# Patient Record
Sex: FEMALE
[De-identification: names, ages, dates, MRNs, and addresses within clinical notes are randomized per-mention and may not be internally consistent; named-entity substitution may affect disease eponyms.]

---

## 2018-09-13 PROBLEM — Z00.00 ENCOUNTER FOR PREVENTIVE HEALTH EXAMINATION: Status: ACTIVE | Noted: 2018-09-13

## 2018-09-17 ENCOUNTER — APPOINTMENT (OUTPATIENT)
Dept: ULTRASOUND IMAGING | Facility: CLINIC | Age: 33
End: 2018-09-17
Payer: COMMERCIAL

## 2018-09-17 ENCOUNTER — OUTPATIENT (OUTPATIENT)
Dept: OUTPATIENT SERVICES | Facility: HOSPITAL | Age: 33
LOS: 1 days | End: 2018-09-17

## 2018-09-17 PROCEDURE — 76830 TRANSVAGINAL US NON-OB: CPT | Mod: 26

## 2018-09-17 PROCEDURE — 76856 US EXAM PELVIC COMPLETE: CPT | Mod: 26

## 2020-12-28 ENCOUNTER — OUTPATIENT (OUTPATIENT)
Dept: OUTPATIENT SERVICES | Facility: HOSPITAL | Age: 35
LOS: 1 days | End: 2020-12-28
Payer: COMMERCIAL

## 2020-12-28 DIAGNOSIS — O26.899 OTHER SPECIFIED PREGNANCY RELATED CONDITIONS, UNSPECIFIED TRIMESTER: ICD-10-CM

## 2020-12-28 DIAGNOSIS — Z3A.00 WEEKS OF GESTATION OF PREGNANCY NOT SPECIFIED: ICD-10-CM

## 2020-12-28 LAB — AMNISURE ROM (RUPTURE OF MEMBRANES): NEGATIVE — SIGNIFICANT CHANGE UP

## 2020-12-28 PROCEDURE — 99214 OFFICE O/P EST MOD 30 MIN: CPT

## 2020-12-28 PROCEDURE — 84112 EVAL AMNIOTIC FLUID PROTEIN: CPT

## 2020-12-28 PROCEDURE — 76815 OB US LIMITED FETUS(S): CPT | Mod: 26

## 2020-12-28 PROCEDURE — 99213 OFFICE O/P EST LOW 20 MIN: CPT

## 2020-12-29 ENCOUNTER — INPATIENT (INPATIENT)
Facility: HOSPITAL | Age: 35
LOS: 1 days | Discharge: HOME | End: 2020-12-31
Attending: OBSTETRICS & GYNECOLOGY | Admitting: OBSTETRICS & GYNECOLOGY
Payer: COMMERCIAL

## 2020-12-29 ENCOUNTER — RESULT REVIEW (OUTPATIENT)
Age: 35
End: 2020-12-29

## 2020-12-29 VITALS — WEIGHT: 171.08 LBS

## 2020-12-29 DIAGNOSIS — O26.899 OTHER SPECIFIED PREGNANCY RELATED CONDITIONS, UNSPECIFIED TRIMESTER: ICD-10-CM

## 2020-12-29 DIAGNOSIS — Z3A.00 WEEKS OF GESTATION OF PREGNANCY NOT SPECIFIED: ICD-10-CM

## 2020-12-29 LAB
BASOPHILS # BLD AUTO: 0.02 K/UL — SIGNIFICANT CHANGE UP (ref 0–0.2)
BASOPHILS NFR BLD AUTO: 0.2 % — SIGNIFICANT CHANGE UP (ref 0–2)
BLD GP AB SCN SERPL QL: NEGATIVE — SIGNIFICANT CHANGE UP
EOSINOPHIL # BLD AUTO: 0.09 K/UL — SIGNIFICANT CHANGE UP (ref 0–0.5)
EOSINOPHIL NFR BLD AUTO: 1.1 % — SIGNIFICANT CHANGE UP (ref 0–6)
HCT VFR BLD CALC: 39 % — SIGNIFICANT CHANGE UP (ref 34.5–45)
HGB BLD-MCNC: 13 G/DL — SIGNIFICANT CHANGE UP (ref 11.5–15.5)
IMM GRANULOCYTES NFR BLD AUTO: 0.7 % — SIGNIFICANT CHANGE UP (ref 0–1.5)
LYMPHOCYTES # BLD AUTO: 1.69 K/UL — SIGNIFICANT CHANGE UP (ref 1–3.3)
LYMPHOCYTES # BLD AUTO: 20.1 % — SIGNIFICANT CHANGE UP (ref 13–44)
MCHC RBC-ENTMCNC: 30.1 PG — SIGNIFICANT CHANGE UP (ref 27–34)
MCHC RBC-ENTMCNC: 33.3 GM/DL — SIGNIFICANT CHANGE UP (ref 32–36)
MCV RBC AUTO: 90.3 FL — SIGNIFICANT CHANGE UP (ref 80–100)
MONOCYTES # BLD AUTO: 0.5 K/UL — SIGNIFICANT CHANGE UP (ref 0–0.9)
MONOCYTES NFR BLD AUTO: 6 % — SIGNIFICANT CHANGE UP (ref 2–14)
NEUTROPHILS # BLD AUTO: 6.04 K/UL — SIGNIFICANT CHANGE UP (ref 1.8–7.4)
NEUTROPHILS NFR BLD AUTO: 71.9 % — SIGNIFICANT CHANGE UP (ref 43–77)
NRBC # BLD: 0 /100 WBCS — SIGNIFICANT CHANGE UP (ref 0–0)
PLATELET # BLD AUTO: 204 K/UL — SIGNIFICANT CHANGE UP (ref 150–400)
RBC # BLD: 4.32 M/UL — SIGNIFICANT CHANGE UP (ref 3.8–5.2)
RBC # FLD: 13.2 % — SIGNIFICANT CHANGE UP (ref 10.3–14.5)
RH IG SCN BLD-IMP: POSITIVE — SIGNIFICANT CHANGE UP
RH IG SCN BLD-IMP: POSITIVE — SIGNIFICANT CHANGE UP
SARS-COV-2 RNA SPEC QL NAA+PROBE: SIGNIFICANT CHANGE UP
T PALLIDUM AB TITR SER: NEGATIVE — SIGNIFICANT CHANGE UP
WBC # BLD: 8.4 K/UL — SIGNIFICANT CHANGE UP (ref 3.8–10.5)
WBC # FLD AUTO: 8.4 K/UL — SIGNIFICANT CHANGE UP (ref 3.8–10.5)

## 2020-12-29 PROCEDURE — 88307 TISSUE EXAM BY PATHOLOGIST: CPT | Mod: 26

## 2020-12-29 RX ORDER — HYDROCORTISONE 1 %
1 OINTMENT (GRAM) TOPICAL EVERY 6 HOURS
Refills: 0 | Status: DISCONTINUED | OUTPATIENT
Start: 2020-12-29 | End: 2020-12-31

## 2020-12-29 RX ORDER — KETOROLAC TROMETHAMINE 30 MG/ML
30 SYRINGE (ML) INJECTION ONCE
Refills: 0 | Status: DISCONTINUED | OUTPATIENT
Start: 2020-12-29 | End: 2020-12-29

## 2020-12-29 RX ORDER — FENTANYL/BUPIVACAINE/NS/PF 2MCG/ML-.1
250 PLASTIC BAG, INJECTION (ML) INJECTION
Refills: 0 | Status: DISCONTINUED | OUTPATIENT
Start: 2020-12-29 | End: 2020-12-29

## 2020-12-29 RX ORDER — ACETAMINOPHEN 500 MG
975 TABLET ORAL
Refills: 0 | Status: DISCONTINUED | OUTPATIENT
Start: 2020-12-29 | End: 2020-12-31

## 2020-12-29 RX ORDER — OXYCODONE HYDROCHLORIDE 5 MG/1
5 TABLET ORAL
Refills: 0 | Status: DISCONTINUED | OUTPATIENT
Start: 2020-12-29 | End: 2020-12-31

## 2020-12-29 RX ORDER — OXYCODONE HYDROCHLORIDE 5 MG/1
5 TABLET ORAL ONCE
Refills: 0 | Status: DISCONTINUED | OUTPATIENT
Start: 2020-12-29 | End: 2020-12-31

## 2020-12-29 RX ORDER — OXYTOCIN 10 UNIT/ML
333.33 VIAL (ML) INJECTION
Qty: 20 | Refills: 0 | Status: DISCONTINUED | OUTPATIENT
Start: 2020-12-29 | End: 2020-12-31

## 2020-12-29 RX ORDER — BENZOCAINE 10 %
1 GEL (GRAM) MUCOUS MEMBRANE EVERY 6 HOURS
Refills: 0 | Status: DISCONTINUED | OUTPATIENT
Start: 2020-12-29 | End: 2020-12-31

## 2020-12-29 RX ORDER — TETANUS TOXOID, REDUCED DIPHTHERIA TOXOID AND ACELLULAR PERTUSSIS VACCINE, ADSORBED 5; 2.5; 8; 8; 2.5 [IU]/.5ML; [IU]/.5ML; UG/.5ML; UG/.5ML; UG/.5ML
0.5 SUSPENSION INTRAMUSCULAR ONCE
Refills: 0 | Status: DISCONTINUED | OUTPATIENT
Start: 2020-12-29 | End: 2020-12-31

## 2020-12-29 RX ORDER — AER TRAVELER 0.5 G/1
1 SOLUTION RECTAL; TOPICAL EVERY 4 HOURS
Refills: 0 | Status: DISCONTINUED | OUTPATIENT
Start: 2020-12-29 | End: 2020-12-31

## 2020-12-29 RX ORDER — MAGNESIUM HYDROXIDE 400 MG/1
30 TABLET, CHEWABLE ORAL
Refills: 0 | Status: DISCONTINUED | OUTPATIENT
Start: 2020-12-29 | End: 2020-12-31

## 2020-12-29 RX ORDER — SODIUM CHLORIDE 9 MG/ML
1000 INJECTION, SOLUTION INTRAVENOUS
Refills: 0 | Status: DISCONTINUED | OUTPATIENT
Start: 2020-12-29 | End: 2020-12-29

## 2020-12-29 RX ORDER — DIPHENHYDRAMINE HCL 50 MG
25 CAPSULE ORAL EVERY 6 HOURS
Refills: 0 | Status: DISCONTINUED | OUTPATIENT
Start: 2020-12-29 | End: 2020-12-31

## 2020-12-29 RX ORDER — PRAMOXINE HYDROCHLORIDE 150 MG/15G
1 AEROSOL, FOAM RECTAL EVERY 4 HOURS
Refills: 0 | Status: DISCONTINUED | OUTPATIENT
Start: 2020-12-29 | End: 2020-12-31

## 2020-12-29 RX ORDER — LANOLIN
1 OINTMENT (GRAM) TOPICAL EVERY 6 HOURS
Refills: 0 | Status: DISCONTINUED | OUTPATIENT
Start: 2020-12-29 | End: 2020-12-31

## 2020-12-29 RX ORDER — SIMETHICONE 80 MG/1
80 TABLET, CHEWABLE ORAL EVERY 4 HOURS
Refills: 0 | Status: DISCONTINUED | OUTPATIENT
Start: 2020-12-29 | End: 2020-12-31

## 2020-12-29 RX ORDER — CITRIC ACID/SODIUM CITRATE 300-500 MG
15 SOLUTION, ORAL ORAL EVERY 6 HOURS
Refills: 0 | Status: DISCONTINUED | OUTPATIENT
Start: 2020-12-29 | End: 2020-12-29

## 2020-12-29 RX ORDER — IBUPROFEN 200 MG
600 TABLET ORAL EVERY 6 HOURS
Refills: 0 | Status: COMPLETED | OUTPATIENT
Start: 2020-12-29 | End: 2021-11-27

## 2020-12-29 RX ORDER — OXYTOCIN 10 UNIT/ML
2 VIAL (ML) INJECTION
Qty: 30 | Refills: 0 | Status: DISCONTINUED | OUTPATIENT
Start: 2020-12-29 | End: 2020-12-31

## 2020-12-29 RX ORDER — DIBUCAINE 1 %
1 OINTMENT (GRAM) RECTAL EVERY 6 HOURS
Refills: 0 | Status: DISCONTINUED | OUTPATIENT
Start: 2020-12-29 | End: 2020-12-31

## 2020-12-29 RX ORDER — OXYTOCIN 10 UNIT/ML
333.33 VIAL (ML) INJECTION
Qty: 20 | Refills: 0 | Status: DISCONTINUED | OUTPATIENT
Start: 2020-12-29 | End: 2020-12-29

## 2020-12-29 RX ORDER — SODIUM CHLORIDE 9 MG/ML
3 INJECTION INTRAMUSCULAR; INTRAVENOUS; SUBCUTANEOUS EVERY 8 HOURS
Refills: 0 | Status: DISCONTINUED | OUTPATIENT
Start: 2020-12-29 | End: 2020-12-31

## 2020-12-29 RX ADMIN — SODIUM CHLORIDE 125 MILLILITER(S): 9 INJECTION, SOLUTION INTRAVENOUS at 05:00

## 2020-12-29 RX ADMIN — SODIUM CHLORIDE 125 MILLILITER(S): 9 INJECTION, SOLUTION INTRAVENOUS at 05:32

## 2020-12-29 RX ADMIN — Medication 250 MILLILITER(S): at 06:47

## 2020-12-29 RX ADMIN — Medication 30 MILLIGRAM(S): at 19:07

## 2020-12-29 RX ADMIN — Medication 2 MILLIUNIT(S)/MIN: at 10:37

## 2020-12-29 RX ADMIN — Medication 1000 MILLIUNIT(S)/MIN: at 18:26

## 2020-12-29 RX ADMIN — SODIUM CHLORIDE 125 MILLILITER(S): 9 INJECTION, SOLUTION INTRAVENOUS at 12:11

## 2020-12-29 NOTE — LACTATION INITIAL EVALUATION - LACTATION INTERVENTIONS
initiate/review early breastfeeding management guidelines/initiate skin to skin/initiate hand expression routine/initiate/review techniques for position and latch

## 2020-12-29 NOTE — LACTATION INITIAL EVALUATION - NS LACT CON REASON FOR REQ
Met with dyad at about 5 hours of life, s/p  @ 40.6 wks. Mother now P1. Infant has stooled and and is due to void. Per mother infant had latched and fed well in L&D and she denied having any pain with the latch. Infant was sleepy at the time of this visit. He would latch briefly and suck a couple of times before falling asleep at the breast. Attempted via several positions with the same result. Enc mother to hand express and finger feed colostrum. She was observed to have easily expressible colostrum bilaterally. Reviewed breastfeeding education. Emphasized importance of responding to feeding cues, importance of skin to skin and the importance of a proper latch. Demonstrated breast massage and hand expression and mother was able to return demo with colostrum noted bilaterally. Reviewed pertinent information in the postpartum/ guide booklet. Mother has a breast pump for home use. Mother and father verbalized understanding of all information and denied having questions/concerns at this time. Informed about lactation availability./primaparous mom

## 2020-12-30 ENCOUNTER — TRANSCRIPTION ENCOUNTER (OUTPATIENT)
Age: 35
End: 2020-12-30

## 2020-12-30 LAB
SARS-COV-2 IGG SERPL QL IA: NEGATIVE — SIGNIFICANT CHANGE UP
SARS-COV-2 IGM SERPL IA-ACNC: <0.1 INDEX — SIGNIFICANT CHANGE UP

## 2020-12-30 RX ORDER — ACETAMINOPHEN 500 MG
3 TABLET ORAL
Qty: 0 | Refills: 0 | DISCHARGE
Start: 2020-12-30

## 2020-12-30 RX ORDER — BENZOCAINE 10 %
1 GEL (GRAM) MUCOUS MEMBRANE
Qty: 0 | Refills: 0 | DISCHARGE
Start: 2020-12-30

## 2020-12-30 RX ORDER — IBUPROFEN 200 MG
600 TABLET ORAL EVERY 6 HOURS
Refills: 0 | Status: DISCONTINUED | OUTPATIENT
Start: 2020-12-30 | End: 2020-12-31

## 2020-12-30 RX ORDER — IBUPROFEN 200 MG
1 TABLET ORAL
Qty: 0 | Refills: 0 | DISCHARGE
Start: 2020-12-30

## 2020-12-30 RX ADMIN — Medication 975 MILLIGRAM(S): at 10:00

## 2020-12-30 RX ADMIN — Medication 975 MILLIGRAM(S): at 21:23

## 2020-12-30 RX ADMIN — Medication 975 MILLIGRAM(S): at 09:37

## 2020-12-30 RX ADMIN — Medication 600 MILLIGRAM(S): at 13:00

## 2020-12-30 RX ADMIN — Medication 600 MILLIGRAM(S): at 07:14

## 2020-12-30 RX ADMIN — Medication 975 MILLIGRAM(S): at 16:00

## 2020-12-30 RX ADMIN — Medication 1 APPLICATION(S): at 09:36

## 2020-12-30 RX ADMIN — Medication 600 MILLIGRAM(S): at 12:48

## 2020-12-30 RX ADMIN — Medication 600 MILLIGRAM(S): at 06:30

## 2020-12-30 RX ADMIN — Medication 1 TABLET(S): at 12:47

## 2020-12-30 RX ADMIN — Medication 1 APPLICATION(S): at 09:35

## 2020-12-30 RX ADMIN — Medication 600 MILLIGRAM(S): at 18:16

## 2020-12-30 RX ADMIN — Medication 975 MILLIGRAM(S): at 02:25

## 2020-12-30 RX ADMIN — Medication 1 SPRAY(S): at 09:36

## 2020-12-30 RX ADMIN — SODIUM CHLORIDE 3 MILLILITER(S): 9 INJECTION INTRAMUSCULAR; INTRAVENOUS; SUBCUTANEOUS at 14:00

## 2020-12-30 RX ADMIN — Medication 975 MILLIGRAM(S): at 03:00

## 2020-12-30 RX ADMIN — Medication 975 MILLIGRAM(S): at 22:00

## 2020-12-30 RX ADMIN — Medication 975 MILLIGRAM(S): at 15:44

## 2020-12-30 NOTE — DISCHARGE NOTE OB - CARE PROVIDER_API CALL
Sharlene Quinn)  Obstetrics and Gynecology  [facility], [address]  [place] [address]  Phone: [phone]  Fax: [phone]  Follow Up Time:

## 2020-12-30 NOTE — DISCHARGE NOTE OB - PATIENT PORTAL LINK FT
You can access the FollowMyHealth Patient Portal offered by [facility] by registering at the following website: [email or web address]. By joining [employer]’s FollowMyHealth portal, you will also be able to view your health information using other applications (apps) compatible with our system.

## 2020-12-30 NOTE — PROGRESS NOTE ADULT - ASSESSMENT
A/P 35y PPD#1 s/p , stable  1. Pain: well controlled on OPM  2. GI: Regular diet  3. : voiding  4. DVT prophylaxis: ambulate  5. Dispo: PPD 1 or 2, unless otherwise specified No

## 2020-12-30 NOTE — DISCHARGE NOTE OB - MATERIALS PROVIDED
Vaccinations/[facility]  Screening Program/  Immunization Record/Breastfeeding Log/Guide to Postpartum Care/[facility] Hearing Screen Program/Back To Sleep Handout/Shaken Baby Prevention Handout/Breastfeeding Guide and Packet/Birth Certificate Instructions/Tdap Vaccination (VIS Pub Date: 2012)

## 2020-12-31 VITALS
OXYGEN SATURATION: 98 % | TEMPERATURE: 98 F | HEART RATE: 61 BPM | SYSTOLIC BLOOD PRESSURE: 103 MMHG | DIASTOLIC BLOOD PRESSURE: 66 MMHG | RESPIRATION RATE: 18 BRPM

## 2020-12-31 LAB — SURGICAL PATHOLOGY STUDY: SIGNIFICANT CHANGE UP

## 2020-12-31 PROCEDURE — 86850 RBC ANTIBODY SCREEN: CPT

## 2020-12-31 PROCEDURE — 86901 BLOOD TYPING SEROLOGIC RH(D): CPT

## 2020-12-31 PROCEDURE — 88307 TISSUE EXAM BY PATHOLOGIST: CPT

## 2020-12-31 PROCEDURE — 59050 FETAL MONITOR W/REPORT: CPT

## 2020-12-31 PROCEDURE — 99214 OFFICE O/P EST MOD 30 MIN: CPT

## 2020-12-31 PROCEDURE — 86900 BLOOD TYPING SEROLOGIC ABO: CPT

## 2020-12-31 PROCEDURE — 85025 COMPLETE CBC W/AUTO DIFF WBC: CPT

## 2020-12-31 PROCEDURE — 86780 TREPONEMA PALLIDUM: CPT

## 2020-12-31 PROCEDURE — U0003: CPT

## 2020-12-31 PROCEDURE — 36415 COLL VENOUS BLD VENIPUNCTURE: CPT

## 2020-12-31 PROCEDURE — 86769 SARS-COV-2 COVID-19 ANTIBODY: CPT

## 2020-12-31 RX ADMIN — Medication 975 MILLIGRAM(S): at 03:06

## 2020-12-31 RX ADMIN — Medication 1 TABLET(S): at 12:31

## 2020-12-31 RX ADMIN — Medication 600 MILLIGRAM(S): at 05:54

## 2020-12-31 RX ADMIN — Medication 975 MILLIGRAM(S): at 09:34

## 2020-12-31 RX ADMIN — Medication 600 MILLIGRAM(S): at 12:32

## 2020-12-31 RX ADMIN — Medication 1 APPLICATION(S): at 09:33

## 2020-12-31 RX ADMIN — Medication 600 MILLIGRAM(S): at 06:30

## 2020-12-31 RX ADMIN — Medication 975 MILLIGRAM(S): at 02:29

## 2020-12-31 NOTE — PROGRESS NOTE ADULT - SUBJECTIVE AND OBJECTIVE BOX
Patient evaluated at bedside.   She reports pain is well controlled with po pain meds  She denies headache, dizziness, chest pain, palpitations, shortness of breathe, nausea, vomiting, heavy vaginal bleeding or perineal discomfort.  She has been ambulating without assistance, voiding spontaneously, and is breastfeeding.    Physical Exam:  Vital Signs Last 24 Hrs  T(C): 36.6 (30 Dec 2020 06:00), Max: 37.3 (29 Dec 2020 21:00)  T(F): 97.8 (30 Dec 2020 06:00), Max: 99.1 (29 Dec 2020 21:00)  HR: 56 (30 Dec 2020 06:00) (56 - 68)  BP: 122/76 (30 Dec 2020 06:00) (100/50 - 122/76)  BP(mean): 91 (30 Dec 2020 06:00) (78 - 91)  RR: 18 (30 Dec 2020 06:00) (16 - 18)  SpO2: 100% (30 Dec 2020 06:00) (96% - 100%)    GA: NAD, A+0 x 3  Abd: + BS, soft, nontender, nondistended, no rebound or guarding, uterus firm at midline,   fb below umbilicus  : lochia WNL  Extremities: no swelling or calf tenderness, reflexes +2 bilaterally                          13.0   8.40  )-----------( 204      ( 29 Dec 2020 04:56 )             39.0               
Patient evaluated at bedside.  No acute events overnight.    She reports pain is well controlled with medications.     She has been ambulating without assistance and is voiding spontaneously. Reports decrease in vaginal bleeding and denies clots.     She denies HA, dizziness, chest pain, palpitations, shortness of breath, n/v, heavy vaginal bleeding or perineal discomfort.    Physical Exam:  Vital Signs Last 24 Hrs  T(C): 37.1 (31 Dec 2020 05:25), Max: 37.1 (31 Dec 2020 05:25)  T(F): 98.7 (31 Dec 2020 05:25), Max: 98.7 (31 Dec 2020 05:25)  HR: 54 (31 Dec 2020 05:25) (52 - 70)  BP: 115/69 (31 Dec 2020 05:25) (110/71 - 115/69)  BP(mean): --  RR: 18 (31 Dec 2020 05:25) (17 - 18)  SpO2: 97% (31 Dec 2020 05:25) (96% - 100%)    GA: NAD, A+0 x 3  Abd: + BS, soft, appropriately tender, nondistended, no rebound or guarding, uterus firm at midline  : lochia WNL  Extremities: no edema or calf tenderness

## 2020-12-31 NOTE — PROGRESS NOTE ADULT - ASSESSMENT
A/P yo 35y  s/p , PP#2 , stable, meeting postpartum milestones  - Pain: controlled on tylenol and motrin  - GI: Tolerating regular diet  - :  Voiding spontaneously without pain or difficulty  - DVT prophylaxis: ambulating well, no SCDs needed at this time   - Dispo: PP#2 unless otherwise specified

## 2021-01-03 DIAGNOSIS — O48.0 POST-TERM PREGNANCY: ICD-10-CM

## 2021-01-03 DIAGNOSIS — Z3A.40 40 WEEKS GESTATION OF PREGNANCY: ICD-10-CM

## 2021-01-16 ENCOUNTER — TRANSCRIPTION ENCOUNTER (OUTPATIENT)
Age: 36
End: 2021-01-16

## 2022-10-27 ENCOUNTER — NON-APPOINTMENT (OUTPATIENT)
Age: 37
End: 2022-10-27

## 2022-12-19 ENCOUNTER — APPOINTMENT (OUTPATIENT)
Dept: ENDOCRINOLOGY | Facility: CLINIC | Age: 37
End: 2022-12-19

## 2023-03-02 ENCOUNTER — APPOINTMENT (OUTPATIENT)
Dept: ENDOCRINOLOGY | Facility: CLINIC | Age: 38
End: 2023-03-02

## 2023-04-21 RX ORDER — ACETAMINOPHEN 500 MG
1000 TABLET ORAL ONCE
Refills: 0 | Status: COMPLETED | OUTPATIENT
Start: 2023-04-24 | End: 2023-04-24

## 2023-04-21 NOTE — ASU PATIENT PROFILE, ADULT - NSICDXPASTMEDICALHX_GEN_ALL_CORE_FT
PAST MEDICAL HISTORY:  H/O cardiac arrhythmia had sonogram and everything was ok    Hypothyroid

## 2023-04-21 NOTE — ASU PATIENT PROFILE, ADULT - FALL HARM RISK - UNIVERSAL INTERVENTIONS
Bed in lowest position, wheels locked, appropriate side rails in place/Call bell, personal items and telephone in reach/Instruct patient to call for assistance before getting out of bed or chair/Non-slip footwear when patient is out of bed/[place] to call system/Physically safe environment - no spills, clutter or unnecessary equipment/Purposeful Proactive Rounding/Room/bathroom lighting operational, light cord in reach

## 2023-04-23 ENCOUNTER — TRANSCRIPTION ENCOUNTER (OUTPATIENT)
Age: 38
End: 2023-04-23

## 2023-04-24 ENCOUNTER — TRANSCRIPTION ENCOUNTER (OUTPATIENT)
Age: 38
End: 2023-04-24

## 2023-04-24 ENCOUNTER — OUTPATIENT (OUTPATIENT)
Dept: OUTPATIENT SERVICES | Facility: HOSPITAL | Age: 38
LOS: 1 days | Discharge: HOME | End: 2023-04-24
Payer: COMMERCIAL

## 2023-04-24 VITALS
OXYGEN SATURATION: 100 % | RESPIRATION RATE: 17 BRPM | HEART RATE: 61 BPM | SYSTOLIC BLOOD PRESSURE: 103 MMHG | WEIGHT: 138.89 LBS | HEIGHT: 65 IN | TEMPERATURE: 99 F | DIASTOLIC BLOOD PRESSURE: 70 MMHG

## 2023-04-24 VITALS
HEART RATE: 65 BPM | OXYGEN SATURATION: 98 % | SYSTOLIC BLOOD PRESSURE: 100 MMHG | RESPIRATION RATE: 17 BRPM | DIASTOLIC BLOOD PRESSURE: 65 MMHG | TEMPERATURE: 99 F

## 2023-04-24 DIAGNOSIS — Z98.890 OTHER SPECIFIED POSTPROCEDURAL STATES: Chronic | ICD-10-CM

## 2023-04-24 DIAGNOSIS — K08.409 PARTIAL LOSS OF TEETH, UNSPECIFIED CAUSE, UNSPECIFIED CLASS: Chronic | ICD-10-CM

## 2023-04-24 PROCEDURE — 88300 SURGICAL PATH GROSS: CPT | Mod: 26,59

## 2023-04-24 PROCEDURE — 88305 TISSUE EXAM BY PATHOLOGIST: CPT | Mod: 26

## 2023-04-24 RX ORDER — SODIUM CHLORIDE 9 MG/ML
1000 INJECTION, SOLUTION INTRAVENOUS
Refills: 0 | Status: DISCONTINUED | OUTPATIENT
Start: 2023-04-24 | End: 2023-04-24

## 2023-04-24 RX ORDER — ONDANSETRON 8 MG/1
4 TABLET, FILM COATED ORAL ONCE
Refills: 0 | Status: DISCONTINUED | OUTPATIENT
Start: 2023-04-24 | End: 2023-04-24

## 2023-04-24 RX ORDER — OXYCODONE HYDROCHLORIDE 5 MG/1
5 TABLET ORAL ONCE
Refills: 0 | Status: DISCONTINUED | OUTPATIENT
Start: 2023-04-24 | End: 2023-04-24

## 2023-04-24 RX ORDER — ACETAMINOPHEN 500 MG
650 TABLET ORAL ONCE
Refills: 0 | Status: DISCONTINUED | OUTPATIENT
Start: 2023-04-24 | End: 2023-04-24

## 2023-04-24 RX ADMIN — Medication 1000 MILLIGRAM(S): at 12:31

## 2023-04-24 NOTE — PRE-ANESTHESIA EVALUATION ADULT - NSANTHADDINFOFT_GEN_ALL_CORE
Discussed the plan of general anesthesia and explained all of the risks and benefits of general anesthesia- including risk of cardiopulmonary compromise, eye injury, sore throat, airway injury, postoperative nausea and vomiting, and nerve injury. All questions were answered and patient is in agreement for general anesthesia

## 2023-04-24 NOTE — BRIEF OPERATIVE NOTE - NSICDXBRIEFPROCEDURE_GEN_ALL_CORE_FT
PROCEDURES:  Dilation and curettage of uterus using suction for incomplete  2023 14:55:31  Eli Whittington

## 2023-04-24 NOTE — ASU DISCHARGE PLAN (ADULT/PEDIATRIC) - CARE PROVIDER_API CALL
Mili Turk)  Obstetrics and Gynecology  [address] [place] [address]  Phone: [phone]  Fax: [phone]  Follow Up Time:

## 2023-04-24 NOTE — ASU DISCHARGE PLAN (ADULT/PEDIATRIC) - ASU DC SPECIAL INSTRUCTIONSFT
- Nothing in vagina - no intercourse, tampons, or douching until cleared by your doctor.   - Avoid swimming, tub baths, strenuous activity and heavy lifting until cleared by your doctor.   - Showering is ok.   - Continue oral pain medications as needed for pain: Tylenol 1000 mg every 6 hours, alternate with ibuprofen 600 mg every 6 hours  - Follow up in office in 1-2 weeks for your postoperative visit.  Call the office to confirm your follow up appointment.   - Call the office sooner if you develop severe pain, heavy vaginal bleeding greater than 2 pads in 2 hours, or fevers greater than 100.4 F. Go to the closest emergency room for any of these symptoms if you are not able to contact your doctor.

## 2023-04-24 NOTE — ASU DISCHARGE PLAN (ADULT/PEDIATRIC) - NS MD DC FALL RISK RISK
For information on Fall & Injury Prevention, visit: [email or web address] OR  [email or web address] OR  https://www.cdc.gov/steadi/patient.html

## 2023-04-28 LAB — SURGICAL PATHOLOGY STUDY: SIGNIFICANT CHANGE UP

## 2023-05-11 LAB — CHROM ANALY OVERALL INTERP SPEC-IMP: SIGNIFICANT CHANGE UP

## 2023-05-31 LAB — PRENATAL GENOME CHROMO MICROARRAY: POSITIVE

## 2024-04-05 RX ORDER — DOCUSATE SODIUM 100 MG
3 CAPSULE ORAL
Refills: 0 | DISCHARGE

## 2024-04-05 RX ORDER — LEVOTHYROXINE SODIUM 125 MCG
1 TABLET ORAL
Refills: 0 | DISCHARGE

## 2024-04-15 NOTE — PATIENT PROFILE OB - LIVING CHILDREN, OB PROFILE
Spoke with Pharmacy- new RX called to pharmacist.  Spoke with Mr. Figueroa notified him RX has been called in to his Pharmacy   0

## 2024-05-07 PROBLEM — E03.9 HYPOTHYROIDISM, UNSPECIFIED: Chronic | Status: ACTIVE | Noted: 2023-04-24

## 2024-05-07 PROBLEM — Z86.79 PERSONAL HISTORY OF OTHER DISEASES OF THE CIRCULATORY SYSTEM: Chronic | Status: ACTIVE | Noted: 2023-04-24

## 2024-05-16 NOTE — PATIENT PROFILE OB - POST PARTUM DEPRESSION SCREEN OB 5
05/16/24 0215   Therapeutic Soothing Survey   What Helps When Having a Hard Time Beverage-warm tea or milk;Sensory path/walking hallways;Sitting or talking with staff or another patient;Snack;Watching TV;Calling a friend/family;Music;Punching a pillow or bathroom door;Reading;Wrapping self in blanket or warm blanket   What Makes it More Difficult When Already Upset Being touched;Loud noises/yelling;Being alone   Is There Anything Else You Would Like Us to Know? N/A     My Safety/Recovery Plan     1) Warning Signs: Isolating myself, shaking my leg.     2) Personal Coping Strategies to Calm Myself: Watching YouTube.     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     4) Activities/Social Interactions for Distraction: YouTube.     5) Who to Contact While on the Unit: Staff I know.     6) Making My Surroundings Safe: Talk with another peer.   no

## 2024-05-29 ENCOUNTER — APPOINTMENT (OUTPATIENT)
Dept: ANTEPARTUM | Facility: CLINIC | Age: 39
End: 2024-05-29
Payer: COMMERCIAL

## 2024-05-29 ENCOUNTER — ASOB RESULT (OUTPATIENT)
Age: 39
End: 2024-05-29

## 2024-05-29 PROCEDURE — 93976 VASCULAR STUDY: CPT

## 2024-05-29 PROCEDURE — 76813 OB US NUCHAL MEAS 1 GEST: CPT

## 2024-05-29 PROCEDURE — 36415 COLL VENOUS BLD VENIPUNCTURE: CPT

## 2024-06-24 ENCOUNTER — APPOINTMENT (OUTPATIENT)
Dept: ANTEPARTUM | Facility: CLINIC | Age: 39
End: 2024-06-24
Payer: COMMERCIAL

## 2024-06-24 ENCOUNTER — ASOB RESULT (OUTPATIENT)
Age: 39
End: 2024-06-24

## 2024-06-24 PROCEDURE — 76805 OB US >/= 14 WKS SNGL FETUS: CPT

## 2024-06-24 PROCEDURE — 76817 TRANSVAGINAL US OBSTETRIC: CPT

## 2024-08-05 ENCOUNTER — ASOB RESULT (OUTPATIENT)
Age: 39
End: 2024-08-05

## 2024-08-05 ENCOUNTER — APPOINTMENT (OUTPATIENT)
Dept: ANTEPARTUM | Facility: CLINIC | Age: 39
End: 2024-08-05

## 2024-08-05 PROCEDURE — 76817 TRANSVAGINAL US OBSTETRIC: CPT

## 2024-08-05 PROCEDURE — 76811 OB US DETAILED SNGL FETUS: CPT

## 2024-09-16 ENCOUNTER — APPOINTMENT (OUTPATIENT)
Dept: ANTEPARTUM | Facility: CLINIC | Age: 39
End: 2024-09-16
Payer: COMMERCIAL

## 2024-09-16 ENCOUNTER — ASOB RESULT (OUTPATIENT)
Age: 39
End: 2024-09-16

## 2024-09-16 PROCEDURE — 76820 UMBILICAL ARTERY ECHO: CPT | Mod: 59

## 2024-09-16 PROCEDURE — 76819 FETAL BIOPHYS PROFIL W/O NST: CPT | Mod: 59

## 2024-09-16 PROCEDURE — 76816 OB US FOLLOW-UP PER FETUS: CPT

## 2024-10-07 ENCOUNTER — APPOINTMENT (OUTPATIENT)
Dept: ANTEPARTUM | Facility: CLINIC | Age: 39
End: 2024-10-07
Payer: COMMERCIAL

## 2024-10-07 ENCOUNTER — ASOB RESULT (OUTPATIENT)
Age: 39
End: 2024-10-07

## 2024-10-07 PROCEDURE — 76816 OB US FOLLOW-UP PER FETUS: CPT

## 2024-10-07 PROCEDURE — 76820 UMBILICAL ARTERY ECHO: CPT | Mod: 59

## 2024-10-07 PROCEDURE — 76819 FETAL BIOPHYS PROFIL W/O NST: CPT | Mod: 59

## 2024-11-04 ENCOUNTER — ASOB RESULT (OUTPATIENT)
Age: 39
End: 2024-11-04

## 2024-11-04 ENCOUNTER — APPOINTMENT (OUTPATIENT)
Dept: ANTEPARTUM | Facility: CLINIC | Age: 39
End: 2024-11-04
Payer: COMMERCIAL

## 2024-11-04 PROCEDURE — 76816 OB US FOLLOW-UP PER FETUS: CPT

## 2024-11-04 PROCEDURE — 76820 UMBILICAL ARTERY ECHO: CPT | Mod: 59

## 2024-11-04 PROCEDURE — 76819 FETAL BIOPHYS PROFIL W/O NST: CPT | Mod: 59

## 2024-11-18 ENCOUNTER — APPOINTMENT (OUTPATIENT)
Dept: ANTEPARTUM | Facility: CLINIC | Age: 39
End: 2024-11-18
Payer: COMMERCIAL

## 2024-11-18 ENCOUNTER — OUTPATIENT (OUTPATIENT)
Dept: OUTPATIENT SERVICES | Facility: HOSPITAL | Age: 39
LOS: 1 days | End: 2024-11-18
Payer: COMMERCIAL

## 2024-11-18 ENCOUNTER — ASOB RESULT (OUTPATIENT)
Age: 39
End: 2024-11-18

## 2024-11-18 DIAGNOSIS — Z98.890 OTHER SPECIFIED POSTPROCEDURAL STATES: Chronic | ICD-10-CM

## 2024-11-18 DIAGNOSIS — K08.409 PARTIAL LOSS OF TEETH, UNSPECIFIED CAUSE, UNSPECIFIED CLASS: Chronic | ICD-10-CM

## 2024-11-18 DIAGNOSIS — Z01.818 ENCOUNTER FOR OTHER PREPROCEDURAL EXAMINATION: ICD-10-CM

## 2024-11-18 LAB
ALBUMIN SERPL ELPH-MCNC: 3.5 G/DL — SIGNIFICANT CHANGE UP (ref 3.3–5)
ALP SERPL-CCNC: 151 U/L — HIGH (ref 40–120)
ALT FLD-CCNC: 18 U/L — SIGNIFICANT CHANGE UP (ref 10–45)
ANION GAP SERPL CALC-SCNC: 10 MMOL/L — SIGNIFICANT CHANGE UP (ref 5–17)
AST SERPL-CCNC: 14 U/L — SIGNIFICANT CHANGE UP (ref 10–40)
BILIRUB SERPL-MCNC: <0.2 MG/DL — SIGNIFICANT CHANGE UP (ref 0.2–1.2)
BLD GP AB SCN SERPL QL: NEGATIVE — SIGNIFICANT CHANGE UP
BUN SERPL-MCNC: 7 MG/DL — SIGNIFICANT CHANGE UP (ref 7–23)
CALCIUM SERPL-MCNC: 8.7 MG/DL — SIGNIFICANT CHANGE UP (ref 8.4–10.5)
CHLORIDE SERPL-SCNC: 104 MMOL/L — SIGNIFICANT CHANGE UP (ref 96–108)
CO2 SERPL-SCNC: 23 MMOL/L — SIGNIFICANT CHANGE UP (ref 22–31)
CREAT SERPL-MCNC: 0.46 MG/DL — LOW (ref 0.5–1.3)
EGFR: 123 ML/MIN/1.73M2 — SIGNIFICANT CHANGE UP
GLUCOSE SERPL-MCNC: 109 MG/DL — HIGH (ref 70–99)
HCT VFR BLD CALC: 35.5 % — SIGNIFICANT CHANGE UP (ref 34.5–45)
HGB BLD-MCNC: 11.4 G/DL — LOW (ref 11.5–15.5)
MCHC RBC-ENTMCNC: 28.6 PG — SIGNIFICANT CHANGE UP (ref 27–34)
MCHC RBC-ENTMCNC: 32.1 G/DL — SIGNIFICANT CHANGE UP (ref 32–36)
MCV RBC AUTO: 89.2 FL — SIGNIFICANT CHANGE UP (ref 80–100)
NRBC # BLD: 0 /100 WBCS — SIGNIFICANT CHANGE UP (ref 0–0)
PLATELET # BLD AUTO: 247 K/UL — SIGNIFICANT CHANGE UP (ref 150–400)
POTASSIUM SERPL-MCNC: 3.8 MMOL/L — SIGNIFICANT CHANGE UP (ref 3.5–5.3)
POTASSIUM SERPL-SCNC: 3.8 MMOL/L — SIGNIFICANT CHANGE UP (ref 3.5–5.3)
PROT SERPL-MCNC: 5.9 G/DL — LOW (ref 6–8.3)
RBC # BLD: 3.98 M/UL — SIGNIFICANT CHANGE UP (ref 3.8–5.2)
RBC # FLD: 13.5 % — SIGNIFICANT CHANGE UP (ref 10.3–14.5)
RH IG SCN BLD-IMP: POSITIVE — SIGNIFICANT CHANGE UP
SODIUM SERPL-SCNC: 137 MMOL/L — SIGNIFICANT CHANGE UP (ref 135–145)
WBC # BLD: 7.2 K/UL — SIGNIFICANT CHANGE UP (ref 3.8–10.5)
WBC # FLD AUTO: 7.2 K/UL — SIGNIFICANT CHANGE UP (ref 3.8–10.5)

## 2024-11-18 PROCEDURE — 86850 RBC ANTIBODY SCREEN: CPT

## 2024-11-18 PROCEDURE — 76820 UMBILICAL ARTERY ECHO: CPT | Mod: 59

## 2024-11-18 PROCEDURE — 76816 OB US FOLLOW-UP PER FETUS: CPT

## 2024-11-18 PROCEDURE — 85027 COMPLETE CBC AUTOMATED: CPT

## 2024-11-18 PROCEDURE — 86780 TREPONEMA PALLIDUM: CPT

## 2024-11-18 PROCEDURE — 80053 COMPREHEN METABOLIC PANEL: CPT

## 2024-11-18 PROCEDURE — 86900 BLOOD TYPING SEROLOGIC ABO: CPT

## 2024-11-18 PROCEDURE — 76818 FETAL BIOPHYS PROFILE W/NST: CPT | Mod: 59

## 2024-11-18 PROCEDURE — 86901 BLOOD TYPING SEROLOGIC RH(D): CPT

## 2024-11-19 ENCOUNTER — TRANSCRIPTION ENCOUNTER (OUTPATIENT)
Age: 39
End: 2024-11-19

## 2024-11-19 ENCOUNTER — OUTPATIENT (OUTPATIENT)
Dept: INPATIENT UNIT | Facility: HOSPITAL | Age: 39
LOS: 1 days | Discharge: HOME | End: 2024-11-19
Payer: COMMERCIAL

## 2024-11-19 VITALS
OXYGEN SATURATION: 100 % | WEIGHT: 177.03 LBS | DIASTOLIC BLOOD PRESSURE: 61 MMHG | RESPIRATION RATE: 18 BRPM | SYSTOLIC BLOOD PRESSURE: 106 MMHG | HEART RATE: 71 BPM | TEMPERATURE: 98 F | HEIGHT: 65 IN

## 2024-11-19 VITALS
TEMPERATURE: 99 F | HEART RATE: 79 BPM | DIASTOLIC BLOOD PRESSURE: 58 MMHG | OXYGEN SATURATION: 99 % | SYSTOLIC BLOOD PRESSURE: 99 MMHG | RESPIRATION RATE: 16 BRPM

## 2024-11-19 DIAGNOSIS — Z98.890 OTHER SPECIFIED POSTPROCEDURAL STATES: Chronic | ICD-10-CM

## 2024-11-19 DIAGNOSIS — K08.409 PARTIAL LOSS OF TEETH, UNSPECIFIED CAUSE, UNSPECIFIED CLASS: Chronic | ICD-10-CM

## 2024-11-19 LAB
BLD GP AB SCN SERPL QL: NEGATIVE — SIGNIFICANT CHANGE UP
RH IG SCN BLD-IMP: POSITIVE — SIGNIFICANT CHANGE UP
T PALLIDUM AB TITR SER: NEGATIVE — SIGNIFICANT CHANGE UP

## 2024-11-19 PROCEDURE — 59050 FETAL MONITOR W/REPORT: CPT

## 2024-11-19 PROCEDURE — 86900 BLOOD TYPING SEROLOGIC ABO: CPT

## 2024-11-19 PROCEDURE — 86901 BLOOD TYPING SEROLOGIC RH(D): CPT

## 2024-11-19 PROCEDURE — 59025 FETAL NON-STRESS TEST: CPT | Mod: 59

## 2024-11-19 PROCEDURE — 96374 THER/PROPH/DIAG INJ IV PUSH: CPT

## 2024-11-19 PROCEDURE — 86850 RBC ANTIBODY SCREEN: CPT

## 2024-11-19 PROCEDURE — 59412 ANTEPARTUM MANIPULATION: CPT

## 2024-11-19 RX ORDER — TRISODIUM CITRATE DIHYDRATE AND CITRIC ACID MONOHYDRATE 500; 334 MG/5ML; MG/5ML
30 SOLUTION ORAL ONCE
Refills: 0 | Status: COMPLETED | OUTPATIENT
Start: 2024-11-19 | End: 2024-11-19

## 2024-11-19 RX ORDER — 0.9 % SODIUM CHLORIDE 0.9 %
1000 INTRAVENOUS SOLUTION INTRAVENOUS
Refills: 0 | Status: DISCONTINUED | OUTPATIENT
Start: 2024-11-19 | End: 2024-11-19

## 2024-11-19 RX ORDER — .BETA.-CAROTENE, SODIUM ACETATE, ASCORBIC ACID, CHOLECALCIFEROL, .ALPHA.-TOCOPHEROL ACETATE, DL-, THIAMINE MONONITRATE, RIBOFLAVIN, NIACINAMIDE, PYRIDOXINE HYDROCHLORIDE, FOLIC ACID, CYANOCOBALAMIN, CALCIUM CARBONATE, FERROUS FUMARATE, ZINC OXIDE AND CUPRIC OXIDE 2000; 2000; 120; 400; 22; 1.84; 3; 20; 10; 1; 12; 200; 27; 25; 2 [IU]/1; [IU]/1; MG/1; [IU]/1; MG/1; MG/1; MG/1; MG/1; MG/1; MG/1; UG/1; MG/1; MG/1; MG/1; MG/1
0 TABLET ORAL
Refills: 0 | DISCHARGE

## 2024-11-19 RX ORDER — LEVOTHYROXINE SODIUM 150 MCG
1 TABLET ORAL
Refills: 0 | DISCHARGE

## 2024-11-19 RX ORDER — CEFAZOLIN SODIUM 10 G
2000 VIAL (EA) INJECTION ONCE
Refills: 0 | Status: COMPLETED | OUTPATIENT
Start: 2024-11-19 | End: 2024-11-19

## 2024-11-19 RX ORDER — CHLORHEXIDINE GLUCONATE 1.2 MG/ML
1 RINSE ORAL DAILY
Refills: 0 | Status: DISCONTINUED | OUTPATIENT
Start: 2024-11-19 | End: 2024-11-19

## 2024-11-19 RX ORDER — FAMOTIDINE 20 MG/1
20 TABLET, FILM COATED ORAL ONCE
Refills: 0 | Status: COMPLETED | OUTPATIENT
Start: 2024-11-19 | End: 2024-11-19

## 2024-11-19 RX ADMIN — Medication 125 MILLILITER(S): at 12:01

## 2024-11-19 RX ADMIN — TRISODIUM CITRATE DIHYDRATE AND CITRIC ACID MONOHYDRATE 30 MILLILITER(S): 500; 334 SOLUTION ORAL at 13:31

## 2024-11-19 RX ADMIN — FAMOTIDINE 20 MILLIGRAM(S): 20 TABLET, FILM COATED ORAL at 13:31

## 2024-11-19 RX ADMIN — Medication 200 MILLILITER(S): at 11:30

## 2024-11-19 RX ADMIN — Medication 0.25 MILLIGRAM(S): at 14:00

## 2024-11-19 NOTE — DISCHARGE NOTE OB - NS MD DC FALL RISK RISK
For information on Fall & Injury Prevention, visit: [email or web address] OR  [email or web address] OR  https://www.cdc.gov/steadi/patient.html

## 2024-11-19 NOTE — OB RN PATIENT PROFILE - ABORTIONS, OB PROFILE
Managed per primary team  Avoid hypoglycemia  S/p lead extraction  Infection may elevate BG readings       2

## 2024-11-19 NOTE — BRIEF OPERATIVE NOTE - NSICDXBRIEFPOSTOP_GEN_ALL_CORE_FT
POST-OP DIAGNOSIS:  Breech malpresentation successfully converted to cephalic presentation, fetus 1 19-Nov-2024 14:36:01  Sharlene Quinn

## 2024-11-19 NOTE — DISCHARGE NOTE OB - CARE PLAN
Principal Discharge DX:	Breech presentation  Assessment and plan of treatment:	Patient presented for breech presentation. Successful ECV performed without complications.   1

## 2024-11-19 NOTE — DISCHARGE NOTE OB - MEDICATION SUMMARY - MEDICATIONS TO TAKE
I will START or STAY ON the medications listed below when I get home from the hospital:    Prenatal Multivitamins oral tablet  -- orally  -- Indication: For home medication    Synthroid 75 mcg (0.075 mg) oral tablet  -- 1 tab(s) orally  -- Indication: For home medication

## 2024-11-19 NOTE — PRE-ANESTHESIA EVALUATION ADULT - NSANTHPMHFT_GEN_ALL_CORE
OB Hx: G41 ith IUP 37 weeks             G1- MAB D&C              G2- with Labor Epidural             G3-MAB D&C              G4-now

## 2024-11-19 NOTE — OB RN PATIENT PROFILE - NSICDXPASTSURGICALHX_GEN_ALL_CORE_FT
PAST SURGICAL HISTORY:  H/O endoscopy     S/P dilation and curettage     [place] teeth removed

## 2024-11-19 NOTE — DISCHARGE NOTE OB - CARE PROVIDER_API CALL
Sharlene Quinn  Obstetrics and Gynecology  [address] [place] [address], [address]  [place] [address]  Phone: [phone]  Fax: [phone]  Established Patient  Follow Up Time:

## 2024-11-19 NOTE — OB RN INTRAOPERATIVE NOTE - NSOBSELHIDDEN_OBGYN_ALL_OB_FT
[NSOBAttendingProcedure1_OBGYN_ALL_OB_FT:[id number]],[NSRNCirculatorProcedure1_OBGYN_ALL_OB_FT:[id number]=]

## 2024-11-19 NOTE — OB RN PATIENT PROFILE - NSICDXPASTMEDICALHX_GEN_ALL_CORE_FT
PAST MEDICAL HISTORY:  H/O cardiac arrhythmia had sonogram and everything was ok    Hypothyroid      (normal spontaneous vaginal delivery)

## 2024-11-19 NOTE — DISCHARGE NOTE OB - PATIENT PORTAL LINK FT
You can access the FollowMyHealth Patient Portal offered by [facility] by registering at the following website: [email or web address]. By joining [email or web address]’s FollowMyHealth portal, you will also be able to view your health information using other applications (apps) compatible with our system.

## 2024-11-19 NOTE — OB RN INTRAOPERATIVE NOTE - NS_SURGICALCHECK_OBGYN_ALL_OB
[de-identified] : Patient is a 22 y/o male who presents a post op visit. Patient's past medical history is significant for metastatic nasopharyngeal carcinoma in 2013 for which he was treated for and completed 3 cycles of chemotherapy. He also completed radiation therapy. \par \par Today he is s/p surgical resection of right posterior auricular mass on 3/1/22. No complaints postop.\par \par Pathology (3/1/22): \par 1. Lymph node, right posterior auricular:\par - Lymph node with fibro-sclerotic changes and few lymphoid follicles.  Normal flow cytometry. No malignancy\par \par \par Patient's past medical history includes hypothyroid for which he takes Synthroid. \par Patient is Covid vaccinated.   Yes

## 2024-11-19 NOTE — DISCHARGE NOTE OB - DO NOT DIET OR “STARVE” YOURSELF INTO GETTING BACK TO PRE-PREGNANCY SHAPE
Detail Level: Detailed
Treatment Number (Will Not Render If 0): 0
Consent: The patient's consent was obtained including but not limited to risks of crusting, scabbing, blistering, scarring, darker or lighter pigmentary change, recurrence, incomplete removal and infection.
Render Note In Bullet Format When Appropriate: No
Medical Necessity Information: It is in your best interest to select a reason for this procedure from the list below. All of these items fulfill various CMS LCD requirements except the new and changing color options.
Medical Necessity Clause: This procedure was medically necessary because the lesions that were treated were:
Anesthesia Volume In Cc: 0.5
Post-Care Instructions: I reviewed with the patient in detail post-care instructions. Patient is to wear sunprotection, and avoid picking at any of the treated lesions. Pt may apply Vaseline to crusted or scabbing areas.
Statement Selected

## 2024-11-19 NOTE — OB PROVIDER H&P - NSLOWPPHRISK_OBGYN_A_OB
No previous uterine incision/Mena Pregnancy/Less than or equal to 4 previous vaginal births/No known bleeding disorder/No history of postpartum hemorrhage/No other PPH risks indicated

## 2024-11-19 NOTE — DISCHARGE NOTE OB - FINANCIAL ASSISTANCE
[facility] provides services at a reduced cost to those who are determined to be eligible through [facility]’s financial assistance program. Information regarding [facility]’s financial assistance program can be found by going to [email or web address] or by calling [phone].

## 2024-11-19 NOTE — OB RN PATIENT PROFILE - SUICIDE SCREENING QUESTION 1
[facility]  pain as well as ongoing balance difficulty.  I previously performed a C4-7 ACDF on February 3, 2023.  She initially did well after surgery, and reports that her preoperative bilateral upper extremity radicular pain resolved.  I last saw her in May 2023.  At this time, she states that over the last several months she has had worsening left-sided neck pain that radiates into the left shoulder and lateral aspect of the upper arm.  That said, her chief complaint is ongoing balance difficulty.  She walks with a cane.  She has had 1 fall.  Prior treatments include physical therapy.  She also takes Tylenol and ibuprofen as needed.    Patient's BMI is Body mass index is 42.07 kg/m².    14/14 systems reviewed and negative other than what is listed in the history of present illness    There is no problem list on file for this patient.    No past medical history on file.  No past surgical history on file.  Social History     Tobacco Use    Smoking status: Never    Smokeless tobacco: Never   Substance Use Topics    Alcohol use: Yes     Comment: Rarely     family history is not on file.    Current Outpatient Medications:     ascorbic acid (Vitamin C) 1,000 mg tablet, Take 1 tablet (1,000 mg) by mouth once daily., Disp: , Rfl:     aspirin 81 mg EC tablet, Take 1 tablet (81 mg) by mouth once daily., Disp: , Rfl:     biotin 5 mg capsule, Take 1 capsule (5 mg) by mouth once daily., Disp: , Rfl:     cholecalciferol (Vitamin D-3) 25 MCG (1000 UT) tablet, Take 1 tablet (1,000 Units) by mouth once daily., Disp: , Rfl:     clindamycin (Cleocin) 150 mg capsule, Take 1 capsule (150 mg) by mouth if needed., Disp: , Rfl:     esomeprazole (NexIUM) 20 mg DR capsule, Take 1 capsule (20 mg) by  mouth once daily in the morning. Take before meals., Disp: , Rfl:     glipiZIDE XL (Glucotrol XL) 10 mg 24 hr tablet, Take 1 tablet (10 mg) by mouth once daily., Disp: , Rfl:     levothyroxine (Synthroid, Levoxyl) 150 mcg tablet, Take 1 tablet (150 mcg) by mouth once daily in the morning. Take before meals., Disp: , Rfl:     lisinopriL-hydrochlorothiazide 20-12.5 mg tablet, Take 1 tablet by mouth once daily., Disp: , Rfl:     magnesium oxide 500 mg capsule, Take 400 mg by mouth once daily., Disp: , Rfl:     metoprolol succinate XL (Toprol-XL) 50 mg 24 hr tablet, Take 1 tablet (50 mg) by mouth once daily., Disp: , Rfl:     omega-3 fatty acids-fish oil (Fish OiL) 340-1,000 mg capsule, Take 1 capsule by mouth once daily., Disp: , Rfl:     rosuvastatin (Crestor) 5 mg tablet, Take 1 tablet (5 mg) by mouth once daily., Disp: , Rfl:     sertraline (Zoloft) 50 mg tablet, Take 1 tablet (50 mg) by mouth once daily., Disp: , Rfl:     SITagliptin phosphate (Januvia) 100 mg tablet, Take 1 tablet (100 mg) by mouth once daily., Disp: , Rfl:     tirzepatide (Mounjaro) 7.5 mg/0.5 mL pen injector, Inject 7.5 mg under the skin 1 (one) time per week., Disp: , Rfl:     vitamin E 450 mg (1000 unit) capsule, Take 100 Units by mouth once daily., Disp: , Rfl:   Allergies   Allergen Reactions    Cephalexin Itching    Sulfamethoxazole Hives    Ciprofloxacin Rash and Unknown    Latex Rash, Unknown and Swelling     swelling in mouth and vaginal area    Penicillins Rash and Swelling       Physical Examination:    General: Well developed, awake/alert/oriented x3, no distress, alert and cooperative  Skin: Warm and dry, no lesions, no rashes  ENMT: Mucous membranes moist, no apparent injury, no lesions seen  Head/Neck: Neck Supple, no apparent injury  Respiratory/Thorax: Normal breath sounds with good chest expansion, thorax symmetric  Cardiovascular: No pitting edema, no JVD    Motor Strength: 4+/5 in bilateral shoulder abduction, 4/5 strength in  left hand  and left finger abduction, otherwise 5/5 strength throughout bilateral upper extremities    Muscle Bulk: Normal and symmetric in all extremities    Posture:   -- Cervical: Normal  -- Thoracic: Normal  -- Lumbar : Normal    POSITIVE tenderness palpation over the left cervical paraspinal area    Sensation: intact to light touch    Reflexes: Negative Taylor's    Wide-based gait, unable to perform tandem gait    Results:  I personally reviewed and interpreted the imaging results which included an MRI of the cervical spine performed on October 9, 2023.  This shows severe foraminal stenosis on the right at C3-4 and bilaterally at C4-5.  There is moderate foraminal stenosis at C5-6.  There is moderate to severe central stenosis at C6-7 and C7-T1.    Assessment and Plan:    Renita Erazo is a 71 y.o. year old female who presents to the spine clinic in follow up with neck and left upper extremity pain as well as ongoing balance difficulty.  I previously performed a C4-7 ACDF on February 3, 2023.  She initially did well after surgery, and reports that her preoperative bilateral upper extremity radicular pain resolved.  I last saw her in May 2023.  At this time, she states that over the last several months she has had worsening left-sided neck pain that radiates into the left shoulder and lateral aspect of the upper arm.  That said, her chief complaint is ongoing balance difficulty.  She walks with a cane.  She has had 1 fall.  Prior treatments include physical therapy.  She also takes Tylenol and ibuprofen as needed.  She has a wide-based gait on exam and is unable to perform heel toe walking.  Her new MRI shows moderate to severe central stenosis at C6-7 and C7-T1.  She also has severe bilateral foraminal stenosis at C4-5.  I told the patient that her symptoms and imaging findings are consistent with cervical radiculopathy and myelopathy.  I discussed multiple treatment options with the patient, including  a left C4-5 foraminotomy.  I went over the risk associate with surgery, including infection, bleeding, neurologic injury, spinal fluid leak, and need for further procedures.  All the patient's questions were answered and she has elected to proceed with surgery.    I have reviewed all prior documentation and reviewed the electronic medical record since admission. I have personally have reviewed all advanced imaging not just the reports and used my interpretation as documented as the relevant findings. I have reviewed the risks and benefits of all treatment recommendations listed in this note with the patient and family. I spent a total of 25 minutes in service to this patient's care during this date of service.      The above clinical summary has been dictated with voice recognition software. It has not been proofread for grammatical errors, typographical mistakes, or other semantic inconsistencies.    Thank you for visiting our office today. It was our pleasure to take part in your healthcare.     Do not hesitate to call with any questions regarding your plan of care after leaving at [phone] M-F 8am-4pm.     To clinicians, thank you very much for this kind referral. It is a privilege to partner with you in the care of your patients. My office would be delighted to assist you with any further consultations or with questions regarding the plan of care outlined. Do not hesitate to call the office or contact me directly.       Sincerely,      Jefferson Gutierrez MD PhD  Attending Neurosurgeon  [facility]   of Neurological Surgery  [employer]    [facility]  [place]  [address]  [address]  [place] [address]    [facility]  [address]  [address]  [place] [address]    Office: [phone]  Fax: [phone]           No

## 2024-11-19 NOTE — BRIEF OPERATIVE NOTE - OPERATION/FINDINGS
Breech presentation with fundal placenta and adequate MIRIAM. Reassuring FHR preop, throughout procedure by bedside ultrasound continuous monitoring, and postop. 
Patient 40yo  at 37+2 presented for external cephalic version for breech presentation. Patient received epidural anesthesia. TAUS revealed fetus in breech presentation with right lateral placenta. Under US guidance, external cephalic version performed via elevation of fetal buttocks and forward roll maneuver. 3 total attempts. FH remained stable throughout with no decelerations. Mother and baby stable condition. Cuello catheter placed at end of procedure.

## 2024-11-19 NOTE — OB PROVIDER H&P - HISTORY OF PRESENT ILLNESS
TANK JACOBSON is a 40yo  at 37+2 presenting for ECV secondary to breech presentation.     Ante: Spontaneous conception. NIPT low risk. Anatomy scan revealed velamentous cord insertion. Passed GCT. Denies elevated BP. GBS negative. EFW 3098 (54%; AC 74%)     OBHx: G1 - MAB D&C    G2 -  40+6 2020. 4000g   G3 - MAB D&C   G4 - current     GYNHx: denies fibroids, cysts, STIs. +abnormal pap smear , LEEP . Normal pap smears since LEEP     PMH: hypothyroidism   PSH: D&C x 2   Meds: PNV, synthroid 75 mcg QD   Allergies: NKDA     PE:  T(C): --  HR: --  BP: --  RR: --  SpO2: --  GEN: well-appearing, NAD  PULM: no increased WOB  ABD: soft, nontender, gravid  EXT: mild LE edema  TAUS: breech  (image attached)     FHT: baseline ***, moderate variability, +accels, no decels  TOCO: ctx ***  reactive & reassuring     A&P: 40yo  at 37+2 presents for external cephalic version. Fetal status reassuring.   - Admit to L&D for ECV   - Consents signed  - PN reviewed, GBS negative   - NPO, IVF  - continuous tocometry, FHT     D/W Dr. Barcenas   D/W Dr. Quinn

## 2024-11-21 DIAGNOSIS — O26.899 OTHER SPECIFIED PREGNANCY RELATED CONDITIONS, UNSPECIFIED TRIMESTER: ICD-10-CM

## 2024-12-18 ENCOUNTER — INPATIENT (INPATIENT)
Facility: HOSPITAL | Age: 39
LOS: 0 days | Discharge: HOME | End: 2024-12-19
Attending: OBSTETRICS & GYNECOLOGY | Admitting: OBSTETRICS & GYNECOLOGY
Payer: COMMERCIAL

## 2024-12-18 VITALS
TEMPERATURE: 97 F | OXYGEN SATURATION: 98 % | RESPIRATION RATE: 16 BRPM | DIASTOLIC BLOOD PRESSURE: 76 MMHG | HEART RATE: 79 BPM | SYSTOLIC BLOOD PRESSURE: 118 MMHG

## 2024-12-18 DIAGNOSIS — Z98.890 OTHER SPECIFIED POSTPROCEDURAL STATES: Chronic | ICD-10-CM

## 2024-12-18 DIAGNOSIS — K08.409 PARTIAL LOSS OF TEETH, UNSPECIFIED CAUSE, UNSPECIFIED CLASS: Chronic | ICD-10-CM

## 2024-12-18 LAB
BASOPHILS # BLD AUTO: 0.02 K/UL — SIGNIFICANT CHANGE UP (ref 0–0.2)
BASOPHILS NFR BLD AUTO: 0.4 % — SIGNIFICANT CHANGE UP (ref 0–2)
BLD GP AB SCN SERPL QL: NEGATIVE — SIGNIFICANT CHANGE UP
EOSINOPHIL # BLD AUTO: 0.09 K/UL — SIGNIFICANT CHANGE UP (ref 0–0.5)
EOSINOPHIL NFR BLD AUTO: 1.7 % — SIGNIFICANT CHANGE UP (ref 0–6)
HCT VFR BLD CALC: 40 % — SIGNIFICANT CHANGE UP (ref 34.5–45)
HGB BLD-MCNC: 12.9 G/DL — SIGNIFICANT CHANGE UP (ref 11.5–15.5)
IMM GRANULOCYTES NFR BLD AUTO: 0.4 % — SIGNIFICANT CHANGE UP (ref 0–0.9)
LYMPHOCYTES # BLD AUTO: 1.5 K/UL — SIGNIFICANT CHANGE UP (ref 1–3.3)
LYMPHOCYTES # BLD AUTO: 28 % — SIGNIFICANT CHANGE UP (ref 13–44)
MCHC RBC-ENTMCNC: 28.4 PG — SIGNIFICANT CHANGE UP (ref 27–34)
MCHC RBC-ENTMCNC: 32.3 G/DL — SIGNIFICANT CHANGE UP (ref 32–36)
MCV RBC AUTO: 88.1 FL — SIGNIFICANT CHANGE UP (ref 80–100)
MONOCYTES # BLD AUTO: 0.43 K/UL — SIGNIFICANT CHANGE UP (ref 0–0.9)
MONOCYTES NFR BLD AUTO: 8 % — SIGNIFICANT CHANGE UP (ref 2–14)
NEUTROPHILS # BLD AUTO: 3.3 K/UL — SIGNIFICANT CHANGE UP (ref 1.8–7.4)
NEUTROPHILS NFR BLD AUTO: 61.5 % — SIGNIFICANT CHANGE UP (ref 43–77)
NRBC # BLD: 0 /100 WBCS — SIGNIFICANT CHANGE UP (ref 0–0)
PLATELET # BLD AUTO: 219 K/UL — SIGNIFICANT CHANGE UP (ref 150–400)
RBC # BLD: 4.54 M/UL — SIGNIFICANT CHANGE UP (ref 3.8–5.2)
RBC # FLD: 14.3 % — SIGNIFICANT CHANGE UP (ref 10.3–14.5)
RH IG SCN BLD-IMP: POSITIVE — SIGNIFICANT CHANGE UP
WBC # BLD: 5.36 K/UL — SIGNIFICANT CHANGE UP (ref 3.8–10.5)
WBC # FLD AUTO: 5.36 K/UL — SIGNIFICANT CHANGE UP (ref 3.8–10.5)

## 2024-12-18 RX ORDER — TRISODIUM CITRATE DIHYDRATE AND CITRIC ACID MONOHYDRATE 500; 334 MG/5ML; MG/5ML
15 SOLUTION ORAL EVERY 6 HOURS
Refills: 0 | Status: DISCONTINUED | OUTPATIENT
Start: 2024-12-18 | End: 2024-12-18

## 2024-12-18 RX ORDER — DIBUCAINE 1 %
1 OINTMENT (GRAM) TOPICAL EVERY 6 HOURS
Refills: 0 | Status: DISCONTINUED | OUTPATIENT
Start: 2024-12-18 | End: 2024-12-19

## 2024-12-18 RX ORDER — BENZOCAINE 10 %
1 OINTMENT (GRAM) TOPICAL EVERY 6 HOURS
Refills: 0 | Status: DISCONTINUED | OUTPATIENT
Start: 2024-12-18 | End: 2024-12-19

## 2024-12-18 RX ORDER — OXYCODONE HYDROCHLORIDE 30 MG/1
5 TABLET ORAL ONCE
Refills: 0 | Status: DISCONTINUED | OUTPATIENT
Start: 2024-12-18 | End: 2024-12-19

## 2024-12-18 RX ORDER — LANOLIN 72 %
1 OINTMENT (GRAM) TOPICAL EVERY 6 HOURS
Refills: 0 | Status: DISCONTINUED | OUTPATIENT
Start: 2024-12-18 | End: 2024-12-19

## 2024-12-18 RX ORDER — ACETAMINOPHEN 500MG 500 MG/1
975 TABLET, COATED ORAL
Refills: 0 | Status: DISCONTINUED | OUTPATIENT
Start: 2024-12-18 | End: 2024-12-19

## 2024-12-18 RX ORDER — PRAMOXINE HYDROCHLORIDE 1 MG/ML
1 LOTION TOPICAL EVERY 4 HOURS
Refills: 0 | Status: DISCONTINUED | OUTPATIENT
Start: 2024-12-18 | End: 2024-12-19

## 2024-12-18 RX ORDER — IBUPROFEN 200 MG
600 TABLET ORAL EVERY 6 HOURS
Refills: 0 | Status: DISCONTINUED | OUTPATIENT
Start: 2024-12-18 | End: 2024-12-19

## 2024-12-18 RX ORDER — DIPHENHYDRAMINE HCL 25 MG
25 CAPSULE ORAL EVERY 6 HOURS
Refills: 0 | Status: DISCONTINUED | OUTPATIENT
Start: 2024-12-18 | End: 2024-12-19

## 2024-12-18 RX ORDER — TETANUS TOXOID, REDUCED DIPHTHERIA TOXOID AND ACELLULAR PERTUSSIS VACCINE, ADSORBED 5; 2.5; 8; 8; 2.5 [IU]/.5ML; [IU]/.5ML; UG/.5ML; UG/.5ML; UG/.5ML
0.5 SUSPENSION INTRAMUSCULAR ONCE
Refills: 0 | Status: DISCONTINUED | OUTPATIENT
Start: 2024-12-18 | End: 2024-12-19

## 2024-12-18 RX ORDER — HYDROCORTISONE BUTYRATE 0.1 %
1 CREAM (GRAM) TOPICAL EVERY 6 HOURS
Refills: 0 | Status: DISCONTINUED | OUTPATIENT
Start: 2024-12-18 | End: 2024-12-19

## 2024-12-18 RX ORDER — ONDANSETRON HYDROCHLORIDE 4 MG/1
8 TABLET, FILM COATED ORAL ONCE
Refills: 0 | Status: COMPLETED | OUTPATIENT
Start: 2024-12-18 | End: 2024-12-18

## 2024-12-18 RX ORDER — IBUPROFEN 200 MG
600 TABLET ORAL EVERY 6 HOURS
Refills: 0 | Status: COMPLETED | OUTPATIENT
Start: 2024-12-18 | End: 2025-11-16

## 2024-12-18 RX ORDER — WITCH HAZEL 50 G/100ML
1 SOLUTION RECTAL EVERY 4 HOURS
Refills: 0 | Status: DISCONTINUED | OUTPATIENT
Start: 2024-12-18 | End: 2024-12-19

## 2024-12-18 RX ORDER — KETOROLAC TROMETHAMINE 30 MG/ML
30 INJECTION INTRAMUSCULAR; INTRAVENOUS ONCE
Refills: 0 | Status: DISCONTINUED | OUTPATIENT
Start: 2024-12-18 | End: 2024-12-18

## 2024-12-18 RX ORDER — CHLORHEXIDINE GLUCONATE 1.2 MG/ML
1 RINSE ORAL DAILY
Refills: 0 | Status: DISCONTINUED | OUTPATIENT
Start: 2024-12-18 | End: 2024-12-18

## 2024-12-18 RX ORDER — OXYCODONE HYDROCHLORIDE 30 MG/1
5 TABLET ORAL
Refills: 0 | Status: DISCONTINUED | OUTPATIENT
Start: 2024-12-18 | End: 2024-12-19

## 2024-12-18 RX ORDER — .BETA.-CAROTENE, SODIUM ACETATE, ASCORBIC ACID, CHOLECALCIFEROL, .ALPHA.-TOCOPHEROL ACETATE, DL-, THIAMINE MONONITRATE, RIBOFLAVIN, NIACINAMIDE, PYRIDOXINE HYDROCHLORIDE, FOLIC ACID, CYANOCOBALAMIN, CALCIUM CARBONATE, FERROUS FUMARATE, ZINC OXIDE AND CUPRIC OXIDE 2000; 2000; 120; 400; 22; 1.84; 3; 20; 10; 1; 12; 200; 27; 25; 2 [IU]/1; [IU]/1; MG/1; [IU]/1; MG/1; MG/1; MG/1; MG/1; MG/1; MG/1; UG/1; MG/1; MG/1; MG/1; MG/1
1 TABLET ORAL DAILY
Refills: 0 | Status: DISCONTINUED | OUTPATIENT
Start: 2024-12-18 | End: 2024-12-19

## 2024-12-18 RX ORDER — FENTANYL/BUPIVACAINE/NS/PF 2-625MCG/1
250 PLASTIC BAG, INJECTION (ML) INJECTION
Refills: 0 | Status: DISCONTINUED | OUTPATIENT
Start: 2024-12-18 | End: 2024-12-18

## 2024-12-18 RX ORDER — ONDANSETRON HYDROCHLORIDE 4 MG/1
8 TABLET, FILM COATED ORAL ONCE
Refills: 0 | Status: DISCONTINUED | OUTPATIENT
Start: 2024-12-18 | End: 2024-12-18

## 2024-12-18 RX ORDER — 0.9 % SODIUM CHLORIDE 0.9 %
1000 INTRAVENOUS SOLUTION INTRAVENOUS
Refills: 0 | Status: DISCONTINUED | OUTPATIENT
Start: 2024-12-18 | End: 2024-12-18

## 2024-12-18 RX ORDER — SIMETHICONE 125 MG
80 CAPSULE ORAL EVERY 4 HOURS
Refills: 0 | Status: DISCONTINUED | OUTPATIENT
Start: 2024-12-18 | End: 2024-12-19

## 2024-12-18 RX ORDER — SODIUM CHLORIDE 9 MG/ML
3 INJECTION, SOLUTION INTRAMUSCULAR; INTRAVENOUS; SUBCUTANEOUS EVERY 8 HOURS
Refills: 0 | Status: DISCONTINUED | OUTPATIENT
Start: 2024-12-18 | End: 2024-12-19

## 2024-12-18 RX ADMIN — KETOROLAC TROMETHAMINE 30 MILLIGRAM(S): 30 INJECTION INTRAMUSCULAR; INTRAVENOUS at 14:05

## 2024-12-18 RX ADMIN — ACETAMINOPHEN 500MG 975 MILLIGRAM(S): 500 TABLET, COATED ORAL at 18:39

## 2024-12-18 RX ADMIN — SODIUM CHLORIDE 3 MILLILITER(S): 9 INJECTION, SOLUTION INTRAMUSCULAR; INTRAVENOUS; SUBCUTANEOUS at 14:57

## 2024-12-18 RX ADMIN — Medication 600 MILLIGRAM(S): at 22:10

## 2024-12-18 RX ADMIN — ACETAMINOPHEN 500MG 975 MILLIGRAM(S): 500 TABLET, COATED ORAL at 23:34

## 2024-12-18 RX ADMIN — Medication 600 MILLIGRAM(S): at 22:45

## 2024-12-18 RX ADMIN — ONDANSETRON HYDROCHLORIDE 8 MILLIGRAM(S): 4 TABLET, FILM COATED ORAL at 10:33

## 2024-12-18 NOTE — OB RN PATIENT PROFILE - NSSDOHINSULT_OBGYN_A_OB
Physician Discharge Summary     Patient ID:  Name: Maryellen Olivares  MRN: [id number]  : 1986    Admit date: 2020    Discharge date: 10/5/2020    Admitting Physician: Dhaval Morataya MD    Discharge Physician: Wojciech OSUNA MD    Admission Diagnoses: Blood in stool [K92.1]  Abdominal pain [R10.9]  Abdominal pain, RUQ (right upper quadrant) [R10.11]    Discharge Diagnoses:   1. HHT with Hepatic AVMs  -- S/P Embolization 9/10/20.  - Complicated by post embolization syndrome requiring NAC and steroids  - LFTs now returned to normal limits             -- Continuing significant RUQ pain  - 4 phase with developing abscess vs necrosis.  - Reviewed with IR, no concern for abscess, no plan for drainage  - Pain management following. Pain now improved. Discharge patient on Gabapentin TID and Percocet PRN  - EGD completed 10/1/20 with no ulcerations or ischemia, 2 small AVMs s/p ablation  -- OLT Candidacy: RHC 10/5 with high outflow state, no Pulm HTN. No further work up needed.  2. H/O High output cardiomyopathy. Echo with EF 60%. Outpatient FT follow up created.     Admission Condition: good    Discharged Condition: good    Hospital Course:   Maryellen Olivares is a 34 year old female with a PMHx significant hereditary hemorrhagic telegectasia. Additional past medical history significant for high output cardiomyopathy and GI AVMs.      Patient had MRI abdomen on 20. This showed intermittent, diffuse reticulonodular changes throughout much of the liver.  Numerous small AVMs throughout the liver with no dominant lesions were identified.  Her scans were discussed at the hepatoma meeting on 2020 and she was advised a CT scan of the liver.  She underwent a 4-phase CT scan of the liver on 2020, this showed no measurable AVMs, numerous suspected telangiectasia and abnormal appearance of arteriovenous shunting were noted. Patient was seen by IR and decision was made for R hepatic artery  embolization.     She underwent embolization on 9/10/20. She had post embolization syndrome requiring steroids and significant post embolization pain requiring pain management consultation. She completed OLT work up while inpatient. She discharged home on 9/18/20.     Patient presents to [facility] ED on 9/30/20 with worsening abdominal pain. Pain management was consulted. Work up was unremarkable except for expected necrosis of the embolized area. It was determined her pain was likely secondary to the procedure. Her pain improved. FT was consulted to complete her liver transplant work up. The patient underwent RHC 10/5/2020 which noted high outflow state but no pulmonary HTN. The patient was stable for discharge home on 10/5/2020.     Consults: Pain Mgmt, AHFT    Significant Diagnostic Studies: Reviewed    Medications     Summary of your Discharge Medications      Take these Medications      Details   bisacodyl 10 MG suppository  Commonly known as: DULCOLAX   Place 1 suppository rectally as needed.     diazePAM 5 MG tablet  Commonly known as: VALIUM   Take 5 mg by mouth as needed.     diphenhydrAMINE 12.5 MG/5ML liquid  Commonly known as: BENADRYL   Take by mouth 4 times daily as needed for Allergies.     ferrous sulfate 325 (65 FE) MG tablet   Take 325 mg by mouth daily after dinner.     furosemide 20 MG tablet  Commonly known as: LASIX   Take 20 mg by mouth 5 days a week. Take Monday-Friday     gabapentin 100 MG capsule  Commonly known as: NEURONTIN   Take 2 caps in the morning and at 2 PM. Take 4 caps in the PM before bedtime.     lisinopril 10 MG tablet  Commonly known as: ZESTRIL   Take 0.5 tablets by mouth daily.  Comment: Please keep on file as a dose change.  No refill needed now.     metoCLOPramide 5 MG tablet  Commonly known as: REGLAN   Use 5-10mg PO as needed for migraine     ondansetron 4 MG disintegrating tablet  Commonly known as: Zofran ODT   Place 1 tablet onto the tongue every 8 hours as needed for  Nausea.     oxyCODONE-acetaminophen 5-325 MG per tablet  Commonly known as: Percocet   Take 2 tablets by mouth every 4 hours as needed for Pain.     pantoprazole 40 MG tablet  Commonly known as: PROTONIX   Take 1 tab po BID x 4 weeks, then decrease to 40mg po daily     polyethylene glycol 17 g packet  Commonly known as: MIRALAX   Take 17 g by mouth 2 times daily. Stir and dissolve powder in any 4 to 8 ounces of beverage, then drink.     [place] 13.5 MG intrauterine device   Generic drug: levonorgestrel  13.5 mg by Intrauterine route. January 2020     sucralfate 1 g tablet  Commonly known as: CARAFATE   Take 1 tablet by mouth 4 times daily.     traMADol 50 MG tablet  Commonly known as: ULTRAM   Take 1 tablet by mouth every 6 hours as needed for Pain.            Discharge Exam:  Vitals:    10/05/20 0944   BP: 105/77   Pulse: 87   Resp: 15   Temp:        Physical Exam  General- Awake/alert in NAD. Calm/cooperative. Well nourished.  Skin- Warm and dry to touch. No rashes/lesions. No jaundice.   CV- S1S2, RRR. No murmurs, rubs or gallops to auscultation.  Pulmonary- Clear breath sounds to auscultation. On RA. Normal Respiratory Effort.  Abdomen- Round, soft, non-tender, and non-distended with palpation. + BS.   Extremities- + PP (radial/pedal). No erythema. No edema.     Disposition: Home     Patient Instructions   1. Call clinic if change in mental status/confusion, yellowing of skin or eyes, fever/chills, weight gain more than 5 pounds, abdominal swelling/distension, abdominal pain, constipation, leg swelling, or uncontrolled bleeding. Clinic Telephone # [phone]  2. Bring medication box, all medication bottles, tracking sheets for vital signs/intake and output, and medication list to clinic appointments.     Nutrition Plan - Regular diet    Psychosocial Concerns - No concerns    Activity - As tolerated    Financial Concerns - No concerns    Follow Ups  Follow Up with Hepatology Brent Wadsworth 10/30/2020    Greater than 30  minutes were spent coordinating the discharge for this patient. Plan of care was discussed with the patient who agreed. All questions and concerns were answered.     Ryder Kim PA-C  Abdominal Transplant & Hepatology  Pager [phone]  10/5/2020    I have seen and examined the patient and I agree with the above mentioned assessment and plan.      34-year-old female with a PMHx significant for hepatic AV malformation secondary to hereditary hemorrhagic telangiectasia S/Pselective embolization of AV malformations. She developed severe pain post procedure needing PCA placement and her most recent imaging shows evidence of hepatic necrosis. She is now undergoing LT evaluation.     Subjective-She wants to go home     Assessment & Plan     Hepatic AV Malformations secondary Hereditary hemorrhagic telangiectasia S/P Embolization 9/10/20.- Analgesia per pain management recommendations..       2. Pre-liver transplant Evaluation- Her case will be discussed at the next evaluation committee meeting          Dispo- Can be discharged home today        Wojciech Wadsworth MD., [facility]  Transplant Hepatology  Pager:  [phone]   never

## 2024-12-18 NOTE — OB PROVIDER H&P - ASSESSMENT
40yo  at 41w3d presenting in early labor    - SROM confirmed with positive pooling and nitrazine  - Admit to L&D  - Consent signed for vaginal delivery and augmentation of labor   - NPO  - IV fluids and labs ordered  - GBS neg  - Continuous EFM     Discussed with Dr. Clementine Lay PGY3 and attending Dr. Ravi Sage, PGY1

## 2024-12-18 NOTE — LACTATION INITIAL EVALUATION - NS LACT CON REASON FOR REQ
Dyad seen at 11hrs post birth. LGA infant on glucose monitoring protocol, maintaining WNL. Mom  1st child(4yrs old) with full supply almost 2yrs. Demonstrate hand expressing colostrum, bilateral copious expressible colostrum present. Place infant on Rt breast with football hold, infant latched with widely open flanged mouth and sucking sustained without nipple pain, mom feels pulling and tugging. Discuss how to maintain and increase breast milk supply, and Triple feeding plan explained when needed. Consult discussed with primary RN, will f/u as needed/multiparous mom

## 2024-12-18 NOTE — OB PROVIDER H&P - HISTORY OF PRESENT ILLNESS
Patient is a 39y  at 41w3d presenting with contractions and LOF. PEr patient, she began sean at 03:45 AM. She also reports a big gush of clear fluid at 03:20.  - VB +FM    Ante: Spontaneous pregnancy. NIPT and anatomy scan wnl. Passed GCT. Normotensive.   EFW 3700g  GBS neg    OBHX:  G1 - 2019 MAB D&C  G2 - 2020  4050g   GynHX: denies fibroids, ovarian cysts, STI/herpes. Reports history of abnormal pap smear s/p LEEP in   MedHX: subclinical hypothyroidism  Surghx: denies  Medications: synthroid 75qd  Allergies: NKDA    Physical Exam:  T(C): 36.3 (24 @ 05:03), Max: 36.3 (24 @ 05:03)  HR: 79 (24 @ 05:03) (79 - 79)  BP: 118/76 (24 @ 05:03) (118/76 - 118/76)  RR: 16 (24 @ 05:03) (16 - 16)  SpO2: 98% (24 @ 05:03) (98% - 98%)    General: NAD  Pulm: no increased WOB  Abdomen: soft, gravid, nontender  Extremities: wnl     TAUS: Cephalic  VE: 4/50/-3  SSE: positive pooling, positive nitrazine    EFM: 140 bpm, mod variability, - accels, - decels; reactive and reassuring  [place]: ctx q 3 min

## 2024-12-18 NOTE — OB PROVIDER DELIVERY SUMMARY - NSPROVIDERDELIVERYNOTE_OBGYN_ALL_OB_FT
liveborn female infant APGARS 9/, 4115g,  first degree laceration repaired with 2-0 and 3-0 chromic,

## 2024-12-18 NOTE — OB RN DELIVERY SUMMARY - NSSELHIDDEN_OBGYN_ALL_OB_FT
[NS_DeliveryAttending1_OBGYN_ALL_OB_FT:[email or web address]=],[NS_DeliveryRN_OBGYN_ALL_OB_FT:[id number]=],[NS_CirculateRN2_OBGYN_ALL_OB_FT:[id number]=]

## 2024-12-18 NOTE — OB RN DELIVERY SUMMARY - BABY A: WEIGHT IN POUNDS (FROM GRAMS), DELIVERY
"Chief Complaint   Patient presents with     Refill Request       Initial There were no vitals taken for this visit. Estimated body mass index is 33.37 kg/(m^2) as calculated from the following:    Height as of 4/25/18: 5' 9\" (1.753 m).    Weight as of 4/25/18: 226 lb (102.5 kg).      Health Maintenance that is potentially due pending provider review:  PHQ9    Possibly completing today per provider review.      "
9

## 2024-12-18 NOTE — OB RN DELIVERY SUMMARY - NS_SEPSISRSKCALC_OBGYN_ALL_OB_FT
EOS calculated successfully. EOS Risk Factor: 0.5/1000 live births ([facility] national incidence); GA=41w3d; Temp=98.4; ROM=8.867; GBS='Negative'; Antibiotics='No antibiotics or any antibiotics < 2 hrs prior to birth'

## 2024-12-18 NOTE — LACTATION INITIAL EVALUATION - MILK SUPPLY
11/29/23 1400   CM/SW Referral Data   Referral Source Social Work (self-referral); Physician   Reason for Referral Discharge planning   Informant Clinical Staff Member;EMR   Medical Hx   Does patient have an established PCP? Yes   Patient Status Prior to Admission   Independent with ADLs and Mobility Yes   Discharge Needs   Anticipated D/C needs Infusion care     Pt discussed in rounds. CHIARA informed pt will require IV abx for dc, spoke to Bella from [facility]. Bella inquired about [facility] for OP abx. CHIARA spoke to KIMI from [address] [place] [address] ([phone]), who confirmed they can provide dalbavancin abx. Appointment scheduled for tomorrow 11/30/23 @ 11:00 AM. RN updated. SW to remain available for dc planning, and/or additional need for support.     Adan Varela, [facility]  Discharge [address]
colostrum

## 2024-12-18 NOTE — OB RN PATIENT PROFILE - FALL HARM RISK - UNIVERSAL INTERVENTIONS
Bed in lowest position, wheels locked, appropriate side rails in place/Call bell, personal items and telephone in reach/Instruct patient to call for assistance before getting out of bed or chair/Non-slip footwear when patient is out of bed/[place] to call system/Physically safe environment - no spills, clutter or unnecessary equipment/Purposeful Proactive Rounding/Room/bathroom lighting operational, light cord in reach

## 2024-12-18 NOTE — OB PROVIDER LABOR PROGRESS NOTE - NS_SUBJECTIVE/OBJECTIVE_OBGYN_ALL_OB_FT
FHT reviewed. Cat I- 145bpm, moderate variability, +Accels, no decels. CTX q 2-5min with expectant management. Plan for epidural. continue to monitor
VE performed 7.5/90/-3 feeling intermittent pressure
cervical check

## 2024-12-18 NOTE — OB RN PATIENT PROFILE - BREASTFEEDING MOTHER TAUGHT HOW TO HAND EXPRESS THEIR OWN MILK
Use the eye drops both eyes for 7 days.     Quarantine until the test is negative.     If fever develops call clinic.      Statement Selected

## 2024-12-18 NOTE — OB RN PATIENT PROFILE - PRO FEEDING PLAN INFANT OB
Airway  Performed by: Rose Marie Delcid MD  Authorized by: Rose Marie Delcid MD     Final Airway Type:  Supraglottic airway  Final Supraglottic Airway:  Unique  SGA Size*:  2  Attempts*:  1  Location:  OR  Urgency:  Elective  Difficult Airway: No    Indications for Airway Management:  Anesthesia  Sedation Level:  Anesthetized  Mask Difficulty Assessment:  1 - vent by mask  Performed By:  ELI         initiation of breastfeeding/breast milk feeding

## 2024-12-18 NOTE — OB PROVIDER DELIVERY SUMMARY - NSSELHIDDEN_OBGYN_ALL_OB_FT
[NS_DeliveryAttending1_OBGYN_ALL_OB_FT:[email or web address]=],[NS_DeliveryRN_OBGYN_ALL_OB_FT:[id number]=],[NS_CirculateRN2_OBGYN_ALL_OB_FT:[id number]=]

## 2024-12-19 ENCOUNTER — TRANSCRIPTION ENCOUNTER (OUTPATIENT)
Age: 39
End: 2024-12-19

## 2024-12-19 VITALS
SYSTOLIC BLOOD PRESSURE: 111 MMHG | TEMPERATURE: 98 F | DIASTOLIC BLOOD PRESSURE: 73 MMHG | OXYGEN SATURATION: 97 % | HEART RATE: 55 BPM | RESPIRATION RATE: 16 BRPM

## 2024-12-19 LAB — T PALLIDUM AB TITR SER: NEGATIVE — SIGNIFICANT CHANGE UP

## 2024-12-19 PROCEDURE — 59050 FETAL MONITOR W/REPORT: CPT

## 2024-12-19 PROCEDURE — 86850 RBC ANTIBODY SCREEN: CPT

## 2024-12-19 PROCEDURE — 85025 COMPLETE CBC W/AUTO DIFF WBC: CPT

## 2024-12-19 PROCEDURE — 86780 TREPONEMA PALLIDUM: CPT

## 2024-12-19 PROCEDURE — 86901 BLOOD TYPING SEROLOGIC RH(D): CPT

## 2024-12-19 PROCEDURE — 86900 BLOOD TYPING SEROLOGIC ABO: CPT

## 2024-12-19 PROCEDURE — 36415 COLL VENOUS BLD VENIPUNCTURE: CPT

## 2024-12-19 RX ORDER — LANOLIN 72 %
1 OINTMENT (GRAM) TOPICAL
Qty: 0 | Refills: 0 | DISCHARGE
Start: 2024-12-19

## 2024-12-19 RX ORDER — ACETAMINOPHEN 500MG 500 MG/1
3 TABLET, COATED ORAL
Qty: 0 | Refills: 0 | DISCHARGE
Start: 2024-12-19

## 2024-12-19 RX ORDER — IBUPROFEN 200 MG
1 TABLET ORAL
Qty: 0 | Refills: 0 | DISCHARGE
Start: 2024-12-19

## 2024-12-19 RX ADMIN — ACETAMINOPHEN 500MG 975 MILLIGRAM(S): 500 TABLET, COATED ORAL at 12:02

## 2024-12-19 RX ADMIN — ACETAMINOPHEN 500MG 975 MILLIGRAM(S): 500 TABLET, COATED ORAL at 00:30

## 2024-12-19 RX ADMIN — Medication 1 APPLICATION(S): at 12:01

## 2024-12-19 RX ADMIN — .BETA.-CAROTENE, SODIUM ACETATE, ASCORBIC ACID, CHOLECALCIFEROL, .ALPHA.-TOCOPHEROL ACETATE, DL-, THIAMINE MONONITRATE, RIBOFLAVIN, NIACINAMIDE, PYRIDOXINE HYDROCHLORIDE, FOLIC ACID, CYANOCOBALAMIN, CALCIUM CARBONATE, FERROUS FUMARATE, ZINC OXIDE AND CUPRIC OXIDE 1 TABLET(S): 2000; 2000; 120; 400; 22; 1.84; 3; 20; 10; 1; 12; 200; 27; 25; 2 TABLET ORAL at 12:02

## 2024-12-19 RX ADMIN — SODIUM CHLORIDE 3 MILLILITER(S): 9 INJECTION, SOLUTION INTRAMUSCULAR; INTRAVENOUS; SUBCUTANEOUS at 15:06

## 2024-12-19 RX ADMIN — ACETAMINOPHEN 500MG 975 MILLIGRAM(S): 500 TABLET, COATED ORAL at 05:36

## 2024-12-19 RX ADMIN — Medication 1 SPRAY(S): at 12:01

## 2024-12-19 RX ADMIN — Medication 600 MILLIGRAM(S): at 04:30

## 2024-12-19 RX ADMIN — ACETAMINOPHEN 500MG 975 MILLIGRAM(S): 500 TABLET, COATED ORAL at 06:59

## 2024-12-19 RX ADMIN — Medication 600 MILLIGRAM(S): at 09:21

## 2024-12-19 RX ADMIN — ACETAMINOPHEN 500MG 975 MILLIGRAM(S): 500 TABLET, COATED ORAL at 18:14

## 2024-12-19 RX ADMIN — Medication 600 MILLIGRAM(S): at 15:53

## 2024-12-19 RX ADMIN — Medication 600 MILLIGRAM(S): at 03:40

## 2024-12-19 NOTE — DISCHARGE NOTE OB - CARE PLAN
1 Principal Discharge DX:	 (normal spontaneous vaginal delivery)  Assessment and plan of treatment:	Vaginal delivery, meeting all postpartum milestones.  Please follow-up with your OB doctor within 6 weeks.  You can resume a regular diet at home and may continue your prenatal vitamins as directed.  Please place nothing in the vagina for 6 weeks (no tampons, sex, douching, tub baths, swimming pools, etc).  If you have severe headaches and/or vision changes, heavy bleeding, or chest pain, please call your provider or go to the nearest Emergency Department.  Please call your OB with any signs of symptoms of infection including fever > 100.4 degrees, severe pain, malodorous vaginal discharge or heavy bleeding requiring more than 1-2 pads/hour.  You can take Motrin 600mg orally every 6 hours for pain as needed.

## 2024-12-19 NOTE — DISCHARGE NOTE OB - FINANCIAL ASSISTANCE
[facility] provides services at a reduced cost to those who are determined to be eligible through [facility]’s financial assistance program. Information regarding [facility]’s financial assistance program can be found by going to [email or web address] or by calling [phone].

## 2024-12-19 NOTE — DISCHARGE NOTE OB - MEDICATION SUMMARY - MEDICATIONS TO TAKE
I will START or STAY ON the medications listed below when I get home from the hospital:    ibuprofen 600 mg oral tablet  -- 1 tab(s) by mouth every 6 hours  -- Indication: For home    acetaminophen 325 mg oral tablet  -- 3 tab(s) by mouth every 6 hours  -- Indication: For home    lanolin topical ointment  -- 1 Apply on skin to affected area every 6 hours As needed nipple soreness  -- Indication: For home    Prenatal Multivitamins oral tablet  -- orally once a day  -- Indication: For home    Synthroid 75 mcg (0.075 mg) oral tablet  -- 1 tab(s) orally  -- Indication: For home

## 2024-12-19 NOTE — PROGRESS NOTE ADULT - SUBJECTIVE AND OBJECTIVE BOX
Patient evaluated at bedside this morning, resting comfortable in bed, no acute events overnight. Vital signs stable overnight.  She reports pain is well controlled with tylenol and motrin.  She denies chest pain, shortness of breath, nausea, vomiting, heavy vaginal bleeding. She has been ambulating without assistance, voiding spontaneously.  Tolerating food well, without nausea/vomit.      Physical Exam:  T(C): 36.6 (24 @ 02:00), Max: 36.7 (24 @ 22:00)  HR: 55 (24 @ 02:00) (55 - 60)  BP: 100/61 (24 @ 02:00) (98/63 - 100/61)  RR: 16 (24 @ 02:00) (16 - 18)  SpO2: 96% (24 @ 02:00) (96% - 97%)    GA: NAD, A&O x 3  Pulm: no increased work of breathing  Abd: soft, nontender, nondistended, no rebound or guarding, uterus firm.  Extremities: no swelling or calf tenderness                          12.9   5.36  )-----------( 219      ( 18 Dec 2024 06:32 )             40.0      acetaminophen     Tablet .. 975 milliGRAM(s) Oral <User Schedule>  benzocaine 20%/menthol 0.5% Spray 1 Spray(s) Topical every 6 hours PRN  dibucaine 1% Ointment 1 Application(s) Topical every 6 hours PRN  diphenhydrAMINE 25 milliGRAM(s) Oral every 6 hours PRN  diphtheria/tetanus/pertussis (acellular) Vaccine (Adacel) 0.5 milliLiter(s) IntraMuscular once  fentanyl (2 MICROgram(s)/mL) + bupivacaine 0.0625%  in 0.9% Sodium Chloride PCEA 250 milliLiter(s) Epidural PCA Continuous  hydrocortisone 1% Cream 1 Application(s) Topical every 6 hours PRN  ibuprofen  Tablet. 600 milliGRAM(s) Oral every 6 hours  lanolin Ointment 1 Application(s) Topical every 6 hours PRN  magnesium hydroxide Suspension 30 milliLiter(s) Oral two times a day PRN  oxyCODONE    IR 5 milliGRAM(s) Oral every 3 hours PRN  oxyCODONE    IR 5 milliGRAM(s) Oral once PRN  oxytocin Infusion 167 milliUNIT(s)/Min IV Continuous <Continuous>  oxytocin Infusion.  milliUNIT(s)/Min IV Continuous <Continuous>  pramoxine 1%/zinc 5% Cream 1 Application(s) Topical every 4 hours PRN  prenatal multivitamin 1 Tablet(s) Oral daily  simethicone 80 milliGRAM(s) Chew every 4 hours PRN  sodium chloride 0.9% lock flush 3 milliLiter(s) IV Push every 8 hours  witch hazel Pads 1 Application(s) Topical every 4 hours PRN        ==  A/P   Pt s/p , PPD#1 , stable, meeting postpartum milestones   - VSS  - Pain: well controlled on tylenol/motrin  - GI: Tolerating regular diet  - : urinating without difficulty/pain  - DVT prophylaxis: ambulating frequently  - Dispo: PPD 2, unless otherwise specified

## 2024-12-19 NOTE — DISCHARGE NOTE OB - PATIENT PORTAL LINK FT
You can access the FollowMyHealth Patient Portal offered by [facility] by registering at the following website: [email or web address]. By joining [facility]’s FollowMyHealth portal, you will also be able to view your health information using other applications (apps) compatible with our system.

## 2024-12-19 NOTE — DISCHARGE NOTE OB - CARE PROVIDER_API CALL
Lynn Pugh  Obstetrics and Gynecology  [address] [place] [id number]  Phone: [phone]  Fax: [phone]  Follow Up Time:

## 2024-12-25 ENCOUNTER — INPATIENT (INPATIENT)
Facility: HOSPITAL | Age: 39
LOS: 1 days | Discharge: HOME | End: 2024-12-27
Attending: OBSTETRICS & GYNECOLOGY | Admitting: OBSTETRICS & GYNECOLOGY
Payer: COMMERCIAL

## 2024-12-25 VITALS
OXYGEN SATURATION: 98 % | RESPIRATION RATE: 17 BRPM | DIASTOLIC BLOOD PRESSURE: 86 MMHG | TEMPERATURE: 98 F | HEART RATE: 47 BPM | HEIGHT: 65 IN | WEIGHT: 161.38 LBS | SYSTOLIC BLOOD PRESSURE: 147 MMHG

## 2024-12-25 DIAGNOSIS — K08.409 PARTIAL LOSS OF TEETH, UNSPECIFIED CAUSE, UNSPECIFIED CLASS: Chronic | ICD-10-CM

## 2024-12-25 DIAGNOSIS — Z98.890 OTHER SPECIFIED POSTPROCEDURAL STATES: Chronic | ICD-10-CM

## 2024-12-25 LAB
ALBUMIN SERPL ELPH-MCNC: 3.6 G/DL — SIGNIFICANT CHANGE UP (ref 3.3–5)
ALP SERPL-CCNC: 147 U/L — HIGH (ref 40–120)
ALT FLD-CCNC: 28 U/L — SIGNIFICANT CHANGE UP (ref 10–45)
ANION GAP SERPL CALC-SCNC: 10 MMOL/L — SIGNIFICANT CHANGE UP (ref 5–17)
APPEARANCE UR: CLEAR — SIGNIFICANT CHANGE UP
APTT BLD: 32.4 SEC — SIGNIFICANT CHANGE UP (ref 24.5–35.6)
AST SERPL-CCNC: 16 U/L — SIGNIFICANT CHANGE UP (ref 10–40)
BILIRUB DIRECT SERPL-MCNC: 0.1 MG/DL — SIGNIFICANT CHANGE UP (ref 0–0.3)
BILIRUB INDIRECT FLD-MCNC: 0.3 MG/DL — SIGNIFICANT CHANGE UP (ref 0.2–1)
BILIRUB SERPL-MCNC: 0.4 MG/DL — SIGNIFICANT CHANGE UP (ref 0.2–1.2)
BILIRUB UR-MCNC: NEGATIVE — SIGNIFICANT CHANGE UP
BUN SERPL-MCNC: 14 MG/DL — SIGNIFICANT CHANGE UP (ref 7–23)
CALCIUM SERPL-MCNC: 8.9 MG/DL — SIGNIFICANT CHANGE UP (ref 8.4–10.5)
CHLORIDE SERPL-SCNC: 100 MMOL/L — SIGNIFICANT CHANGE UP (ref 96–108)
CO2 SERPL-SCNC: 25 MMOL/L — SIGNIFICANT CHANGE UP (ref 22–31)
COLOR SPEC: YELLOW — SIGNIFICANT CHANGE UP
CREAT SERPL-MCNC: 0.61 MG/DL — SIGNIFICANT CHANGE UP (ref 0.5–1.3)
DIFF PNL FLD: ABNORMAL
EGFR: 115 ML/MIN/1.73M2 — SIGNIFICANT CHANGE UP
GLUCOSE SERPL-MCNC: 108 MG/DL — HIGH (ref 70–99)
GLUCOSE UR QL: NEGATIVE MG/DL — SIGNIFICANT CHANGE UP
HCT VFR BLD CALC: 40 % — SIGNIFICANT CHANGE UP (ref 34.5–45)
HGB BLD-MCNC: 12.9 G/DL — SIGNIFICANT CHANGE UP (ref 11.5–15.5)
INR BLD: 1.1 — SIGNIFICANT CHANGE UP (ref 0.85–1.16)
KETONES UR-MCNC: NEGATIVE MG/DL — SIGNIFICANT CHANGE UP
LDH SERPL L TO P-CCNC: 289 U/L — HIGH (ref 50–242)
LEUKOCYTE ESTERASE UR-ACNC: ABNORMAL
MAGNESIUM SERPL-MCNC: 1.8 MG/DL — SIGNIFICANT CHANGE UP (ref 1.6–2.6)
MAGNESIUM SERPL-MCNC: 4.1 MG/DL — HIGH (ref 1.6–2.6)
MCHC RBC-ENTMCNC: 29.1 PG — SIGNIFICANT CHANGE UP (ref 27–34)
MCHC RBC-ENTMCNC: 32.3 G/DL — SIGNIFICANT CHANGE UP (ref 32–36)
MCV RBC AUTO: 90.3 FL — SIGNIFICANT CHANGE UP (ref 80–100)
NITRITE UR-MCNC: NEGATIVE — SIGNIFICANT CHANGE UP
NRBC # BLD: 0 /100 WBCS — SIGNIFICANT CHANGE UP (ref 0–0)
PH UR: 7 — SIGNIFICANT CHANGE UP (ref 5–8)
PLATELET # BLD AUTO: 251 K/UL — SIGNIFICANT CHANGE UP (ref 150–400)
POTASSIUM SERPL-MCNC: 3.8 MMOL/L — SIGNIFICANT CHANGE UP (ref 3.5–5.3)
POTASSIUM SERPL-SCNC: 3.8 MMOL/L — SIGNIFICANT CHANGE UP (ref 3.5–5.3)
PROT SERPL-MCNC: 6.3 G/DL — SIGNIFICANT CHANGE UP (ref 6–8.3)
PROT UR-MCNC: 30 MG/DL
PROTHROM AB SERPL-ACNC: 12.6 SEC — SIGNIFICANT CHANGE UP (ref 9.9–13.4)
RBC # BLD: 4.43 M/UL — SIGNIFICANT CHANGE UP (ref 3.8–5.2)
RBC # FLD: 14.1 % — SIGNIFICANT CHANGE UP (ref 10.3–14.5)
SODIUM SERPL-SCNC: 135 MMOL/L — SIGNIFICANT CHANGE UP (ref 135–145)
SP GR SPEC: 1.02 — SIGNIFICANT CHANGE UP (ref 1–1.03)
URATE SERPL-MCNC: 6.7 MG/DL — SIGNIFICANT CHANGE UP (ref 2.5–7)
UROBILINOGEN FLD QL: 0.2 MG/DL — SIGNIFICANT CHANGE UP (ref 0.2–1)
WBC # BLD: 5.2 K/UL — SIGNIFICANT CHANGE UP (ref 3.8–10.5)
WBC # FLD AUTO: 5.2 K/UL — SIGNIFICANT CHANGE UP (ref 3.8–10.5)

## 2024-12-25 PROCEDURE — 70553 MRI BRAIN STEM W/O & W/DYE: CPT | Mod: 26

## 2024-12-25 PROCEDURE — 99285 EMERGENCY DEPT VISIT HI MDM: CPT

## 2024-12-25 PROCEDURE — 70450 CT HEAD/BRAIN W/O DYE: CPT | Mod: 26,MC,59

## 2024-12-25 PROCEDURE — 70546 MR ANGIOGRAPH HEAD W/O&W/DYE: CPT | Mod: 26,59

## 2024-12-25 PROCEDURE — 70496 CT ANGIOGRAPHY HEAD: CPT | Mod: 26,MC

## 2024-12-25 PROCEDURE — 70498 CT ANGIOGRAPHY NECK: CPT | Mod: 26,MC

## 2024-12-25 RX ORDER — ACETAMINOPHEN 80 MG/.8ML
975 SOLUTION/ DROPS ORAL
Refills: 0 | Status: DISCONTINUED | OUTPATIENT
Start: 2024-12-25 | End: 2024-12-27

## 2024-12-25 RX ORDER — OXYCODONE HCL 15 MG
5 TABLET ORAL ONCE
Refills: 0 | Status: DISCONTINUED | OUTPATIENT
Start: 2024-12-25 | End: 2024-12-27

## 2024-12-25 RX ORDER — METOCLOPRAMIDE 10 MG/1
10 TABLET ORAL ONCE
Refills: 0 | Status: COMPLETED | OUTPATIENT
Start: 2024-12-25 | End: 2024-12-25

## 2024-12-25 RX ORDER — KETOROLAC TROMETHAMINE 30 MG/ML
15 INJECTION INTRAMUSCULAR; INTRAVENOUS ONCE
Refills: 0 | Status: DISCONTINUED | OUTPATIENT
Start: 2024-12-25 | End: 2024-12-25

## 2024-12-25 RX ORDER — OXYCODONE HCL 15 MG
5 TABLET ORAL
Refills: 0 | Status: DISCONTINUED | OUTPATIENT
Start: 2024-12-25 | End: 2024-12-27

## 2024-12-25 RX ORDER — SODIUM CHLORIDE 9 MG/ML
1000 INJECTION, SOLUTION INTRAMUSCULAR; INTRAVENOUS; SUBCUTANEOUS ONCE
Refills: 0 | Status: COMPLETED | OUTPATIENT
Start: 2024-12-25 | End: 2024-12-25

## 2024-12-25 RX ORDER — MAGNESIUM SULFATE 500 MG/ML
1 INJECTION, SOLUTION INTRAMUSCULAR; INTRAVENOUS
Qty: 40 | Refills: 0 | Status: DISCONTINUED | OUTPATIENT
Start: 2024-12-25 | End: 2024-12-27

## 2024-12-25 RX ORDER — KETOROLAC TROMETHAMINE 30 MG/ML
30 INJECTION INTRAMUSCULAR; INTRAVENOUS ONCE
Refills: 0 | Status: DISCONTINUED | OUTPATIENT
Start: 2024-12-25 | End: 2024-12-25

## 2024-12-25 RX ORDER — ACETAMINOPHEN 80 MG/.8ML
1000 SOLUTION/ DROPS ORAL ONCE
Refills: 0 | Status: COMPLETED | OUTPATIENT
Start: 2024-12-25 | End: 2024-12-25

## 2024-12-25 RX ORDER — IBUPROFEN 200 MG
600 TABLET ORAL EVERY 6 HOURS
Refills: 0 | Status: DISCONTINUED | OUTPATIENT
Start: 2024-12-25 | End: 2024-12-27

## 2024-12-25 RX ORDER — IBUPROFEN 200 MG
600 TABLET ORAL EVERY 6 HOURS
Refills: 0 | Status: COMPLETED | OUTPATIENT
Start: 2024-12-25 | End: 2025-11-23

## 2024-12-25 RX ORDER — SODIUM CHLORIDE 9 MG/ML
1000 INJECTION, SOLUTION INTRAVENOUS
Refills: 0 | Status: DISCONTINUED | OUTPATIENT
Start: 2024-12-25 | End: 2024-12-27

## 2024-12-25 RX ORDER — MAGNESIUM SULFATE 500 MG/ML
2 INJECTION, SOLUTION INTRAMUSCULAR; INTRAVENOUS
Qty: 40 | Refills: 0 | Status: DISCONTINUED | OUTPATIENT
Start: 2024-12-25 | End: 2024-12-25

## 2024-12-25 RX ORDER — MAGNESIUM SULFATE 500 MG/ML
4 INJECTION, SOLUTION INTRAMUSCULAR; INTRAVENOUS ONCE
Refills: 0 | Status: COMPLETED | OUTPATIENT
Start: 2024-12-25 | End: 2024-12-25

## 2024-12-25 RX ADMIN — KETOROLAC TROMETHAMINE 30 MILLIGRAM(S): 30 INJECTION INTRAMUSCULAR; INTRAVENOUS at 16:05

## 2024-12-25 RX ADMIN — SODIUM CHLORIDE 1000 MILLILITER(S): 9 INJECTION, SOLUTION INTRAMUSCULAR; INTRAVENOUS; SUBCUTANEOUS at 12:06

## 2024-12-25 RX ADMIN — ACETAMINOPHEN 975 MILLIGRAM(S): 80 SOLUTION/ DROPS ORAL at 21:35

## 2024-12-25 RX ADMIN — METOCLOPRAMIDE 104 MILLIGRAM(S): 10 TABLET ORAL at 10:13

## 2024-12-25 RX ADMIN — KETOROLAC TROMETHAMINE 15 MILLIGRAM(S): 30 INJECTION INTRAMUSCULAR; INTRAVENOUS at 10:33

## 2024-12-25 RX ADMIN — KETOROLAC TROMETHAMINE 15 MILLIGRAM(S): 30 INJECTION INTRAMUSCULAR; INTRAVENOUS at 11:03

## 2024-12-25 RX ADMIN — MAGNESIUM SULFATE 50 GM/HR: 500 INJECTION, SOLUTION INTRAMUSCULAR; INTRAVENOUS at 12:50

## 2024-12-25 RX ADMIN — ACETAMINOPHEN 400 MILLIGRAM(S): 80 SOLUTION/ DROPS ORAL at 14:35

## 2024-12-25 RX ADMIN — MAGNESIUM SULFATE 300 GRAM(S): 500 INJECTION, SOLUTION INTRAMUSCULAR; INTRAVENOUS at 12:28

## 2024-12-25 NOTE — ED PROVIDER NOTE - OBJECTIVE STATEMENT
40 yo F h/o hypothyroid,  s/p   c/o 2 d intermittent ha, v severe since 230a w/o sig relief from tylenol and motrin (last tylenol just pta).  Pain 8/10 now, was 10/10, woke her from sleep.  No associated change in vision/speech/gait, numbness or weakness in ext, cp, palpitations, sob.  No h/o preeclampsia/eclampsia.  No h/o htn.  No uri sx, fever but noted + chills last night.  No neck stiffness, photophobia.  No h/o ha.  No sig change w position change. 40 yo F h/o hypothyroid,  s/p   c/o 2 d intermittent ha, v severe since 230a w/o sig relief from tylenol and motrin (last tylenol just pta).  Pain 8/10 now, was 10/10, woke her from sleep.  No associated change in vision/speech/gait, numbness or weakness in ext, cp, palpitations, sob.  No h/o preeclampsia/eclampsia.  No h/o htn.  No uri sx, fever but noted + chills last night.  No neck stiffness, photophobia.  No h/o ha.  Notes pain worse w laying/sitting but feels somewhat better while walking.

## 2024-12-25 NOTE — ED PROVIDER NOTE - NSICDXPASTSURGICALHX_GEN_ALL_CORE_FT
PAST SURGICAL HISTORY:  H/O endoscopy     S/P dilation and curettage     [place] teeth removed

## 2024-12-25 NOTE — CONSULT NOTE ADULT - SUBJECTIVE AND OBJECTIVE BOX
Neurology Consult Note     TANK JACOBSON 39y Female [id number]  Hospital Day:      HPI  39F. PMH: 7 days post-partum  Presented  c/o headache. Patient reports headache started , but was mild and did not require medication. Headache gradually grew in intensity until falling asleep at 10:30PM , and she woke up 2:30AM  with severe headache described as worst headache of her life. Location of the headache varies, but is typically symmetrical around the sides and back of her head, pressure and aching. Headache feels worse with lying down. Endorses some mild photophobia, denies phnophobia or nausea. Denies fevers or neck stiffness, vision changes, numbness or weakness. Endorses chronic tinnitus unchanged from baseline. She tried tylenol and advil with no relief, and has received 1L bolus, toradol, iv tylenol, Mg, and reglan without relief.     PMH  PRE ECLAMSPIA POSTPARTUM    Hypothyroid    H/O cardiac arrhythmia     (normal spontaneous vaginal delivery)    H/O endoscopy    [place] teeth removed    S/P dilation and curettage        Vital Signs  T(F): 98.4 (14:44), Max: 98.4 (14:10)  HR: 51 (14:44) (47 - 56)  BP: 146/83 (14:44) (124/63 - 147/86)  RR: 18 (14:44) (16 - 18)  SpO2: 99% (14:44) (95% - 99%)    Neurological Exam:   Mental status: Awake, alert and oriented to person, place, and time. Naming, repetition and comprehension intact.  Attention/concentration intact.  No dysarthria, no aphasia.  Fund of knowledge appropriate.    Cranial nerves:   - Eyes: PERRL, EOMI without nystagmus, Visual fields full   - Face: BL V1-V3 sensation intact symmetrically, face symmetric   - ENT: Hearing intact to voice, tongue was midline  Motor: No drift in all 4 extremities, 5/5 RUTH ANN&LE. Normal tone and bulk.  No abnormal movements.    Sensation: Intact to light touch , no extinction   Coordination: No dysmetria on finger-to-nose and heel-to-shin.  No dysdiadokinesia.  Reflexes: 2+ in bilateral UE/LE   Gait: Deferred      Labs:  WBC 5.20 /HGB 12.9 /MCV 90.3 /HCT 40.0 / /     135  |  100  |  14  ----------------------------<  108[H]  3.8   |  25  |  0.61    Ca    8.9      25 Dec 2024 09:51  Mg     1.8         TPro  6.3  /  Alb  3.6  /  TBili  0.4  /  DBili  0.1  /  AST  16  /  ALT  28  /  AlkPhos  147[H]      LIVER FUNCTIONS - ( 25 Dec 2024 09:51 )  Alb: 3.6 g/dL / Pro: 6.3 g/dL / ALK PHOS: 147 U/L / ALT: 28 U/L / AST: 16 U/L / GGT: x           PT/INR - ( 25 Dec 2024 09:51 )   PT: 12.6 sec;   INR: 1.10          PTT - ( 25 Dec 2024 09:51 )  PTT:32.4 sec  Urinalysis Basic - ( 25 Dec 2024 09:51 )    Color: Yellow / Appearance: Clear / S.019 / pH: x  Gluc: 108 mg/dL / Ketone: Negative mg/dL  / Bili: Negative / Urobili: 0.2 mg/dL   Blood: x / Protein: 30 mg/dL / Nitrite: Negative   Leuk Esterase: Small / RBC: 2 /HPF / WBC 26 /HPF   Sq Epi: x / Non Sq Epi: 8 /HPF / Bacteria: Few /HPF        Medications:  acetaminophen     Tablet .. 975 milliGRAM(s) Oral <User Schedule>  ibuprofen  Tablet. 600 milliGRAM(s) Oral every 6 hours  ketorolac   Injectable 30 milliGRAM(s) IV Push once  magnesium sulfate Infusion 2 Gm/Hr IV Continuous <Continuous>  oxyCODONE    IR 5 milliGRAM(s) Oral every 3 hours PRN  oxyCODONE    IR 5 milliGRAM(s) Oral once PRN      Neuroimaging:  CT Angio Head w/ IV Cont:   ACC: [id number] EXAM:  CT ANGIO BRAIN (W)AW IC   ORDERED BY: LUZ PINA   DIRECTOR     ACC: [id number] EXAM:  CT ANGIO NECK (W)AW IC   ORDERED BY: LUZ PINA DIRECTOR     ACC: [id number] EXAM:  CT BRAIN   ORDERED BY: LUZ PINA DIRECTOR     PROCEDURE DATE:  2024          INTERPRETATION:  CLINICAL INFORMATION: post partum severe ha    COMPARISON: CT head and cervical spine 2014.    CONTRAST:  IV Contrast: NONE (accession [id number]), Isovue 370 (accession [id number]),   IV contrast documented in unlinked concurrent exam (accession [id number])    80 cc administered  20 cc discarded  .    TECHNIQUE:  CT BRAIN: Serial axial images were obtained from the skull base to the   vertex without the use of contrast.    CTA NECK/HEAD: After the intravenous power injection of non-ionic   contrast material, serial thin sections were obtained through the   cervical and intracranial circulation on a multislice CT scanner. Axial,   Coronal and Sagittal MIP reformatted images were obtained. 3D   reconstruction was also performed.    FINDINGS:    CT BRAIN:    VENTRICLES AND SULCI: Normal in size and configuration.  INTRA-AXIAL: No mass effect, acute hemorrhage, or midline shift.  EXTRA-AXIAL: No mass or fluid collection. Basal cisterns are normal in   appearance.    VISUALIZED SINUSES:  Clear.  TYMPANOMASTOID CAVITIES:  Clear.  VISUALIZED ORBITS: Normal.  CALVARIUM: Intact.        CTA NECK:    AORTIC ARCH AND VISUALIZED GREAT VESSELS: Within normal limits.    RIGHT:  COMMON CAROTID ARTERY: No significant stenosis to the carotid bifurcation.  INTERNAL CAROTID ARTERY: No significant stenosis based on NASCET criteria.  VERTEBRAL ARTERY: Normal in course and caliber to the intracranial   circulation.    LEFT:  COMMON CAROTID ARTERY: No significant stenosis to the carotid bifurcation.  INTERNAL CAROTID ARTERY: No significant stenosis based on NASCET criteria.  VERTEBRAL ARTERY: Normal in course and caliber to the intracranial   circulation.    VISUALIZED LUNGS: Clear.      CAROTID STENOSIS REFERENCE: Percent(%) stenosis is expressed in terms of   NASCET Criteria. (NASCET = 100x1-(N/D)). N=greatest narrowing. D=normal   distal diameter - MILD = <50% stenosis. - MODERATE = 50-69% stenosis. -   SEVERE = 70-89% stenosis. - HAIRLINE/CRITICAL = 90-99% stenosis. -   OCCLUDED = 100% stenosis.      CTA HEAD:    INTERNAL CAROTID ARTERIES: Bilateral petrous, precavernous, cavernous,   and supraclinoid regions are patent without significant stenosis.    [place] OF NATH: No aneurysm identified.    ANTERIOR CEREBRAL ARTERIES: No significant stenosis or occlusion.  MIDDLE CEREBRAL ARTERIES: No significant stenosis or occlusion.  POSTERIOR CEREBRAL ARTERIES: No significant stenosis or occlusion.    DISTAL VERTEBRAL / BASILAR ARTERIES: No significant stenosis or occlusion.    VENOUS STRUCTURES: Visualized superficial and deep venous structures   appear patent.      IMPRESSION:    CT HEAD:  No acute intracranial hemorrhage, mass effect, or midline shift.    CTA NECK:  No evidence of significant stenosis or occlusion.    CTA HEAD:  No large vessel occlusion, significant stenosis or vascular abnormality   identified. No evidence of aneurysm.        --- End of Report ---          ROSARIO VENEGAS MD; Resident Radiologist  This document has been electronically signed.  LAST MAHER MD; Attending Radiologist  This document has been electronically signed. Dec 25 2024 11:54AM (24 @ 11:06)  CT Head No Cont:   ACC: [id number] EXAM:  CT ANGIO BRAIN (W)AW IC   ORDERED BY: LUZ PINA   DIRECTOR     ACC: [id number] EXAM:  CT ANGIO NECK (W)AW IC   ORDERED BY: LUZ PINA DIRECTOR     ACC: [id number] EXAM:  CT BRAIN   ORDERED BY: LUZ PINA DIRECTOR     PROCEDURE DATE:  2024          INTERPRETATION:  CLINICAL INFORMATION: post partum severe ha    COMPARISON: CT head and cervical spine 2014.    CONTRAST:  IV Contrast: NONE (accession [id number]), Isovue 370 (accession [id number]),   IV contrast documented in unlinked concurrent exam (accession [id number])    80 cc administered  20 cc discarded  .    TECHNIQUE:  CT BRAIN: Serial axial images were obtained from the skull base to the   vertex without the use of contrast.    CTA NECK/HEAD: After the intravenous power injection of non-ionic   contrast material, serial thin sections were obtained through the   cervical and intracranial circulation on a multislice CT scanner. Axial,   Coronal and Sagittal MIP reformatted images were obtained. 3D   reconstruction was also performed.    FINDINGS:    CT BRAIN:    VENTRICLES AND SULCI: Normal in size and configuration.  INTRA-AXIAL: No mass effect, acute hemorrhage, or midline shift.  EXTRA-AXIAL: No mass or fluid collection. Basal cisterns are normal in   appearance.    VISUALIZED SINUSES:  Clear.  TYMPANOMASTOID CAVITIES:  Clear.  VISUALIZED ORBITS: Normal.  CALVARIUM: Intact.        CTA NECK:    AORTIC ARCH AND VISUALIZED GREAT VESSELS: Within normal limits.    RIGHT:  COMMON CAROTID ARTERY: No significant stenosis to the carotid bifurcation.  INTERNAL CAROTID ARTERY: No significant stenosis based on NASCET criteria.  VERTEBRAL ARTERY: Normal in course and caliber to the intracranial   circulation.    LEFT:  COMMON CAROTID ARTERY: No significant stenosis to the carotid bifurcation.  INTERNAL CAROTID ARTERY: No significant stenosis based on NASCET criteria.  VERTEBRAL ARTERY: Normal in course and caliber to the intracranial   circulation.    VISUALIZED LUNGS: Clear.      CAROTID STENOSIS REFERENCE: Percent(%) stenosis is expressed in terms of   NASCET Criteria. (NASCET = 100x1-(N/D)). N=greatest narrowing. D=normal   distal diameter - MILD = <50% stenosis. - MODERATE = 50-69% stenosis. -   SEVERE = 70-89% stenosis. - HAIRLINE/CRITICAL = 90-99% stenosis. -   OCCLUDED = 100% stenosis.      CTA HEAD:    INTERNAL CAROTID ARTERIES: Bilateral petrous, precavernous, cavernous,   and supraclinoid regions are patent without significant stenosis.    [place] OF NATH: No aneurysm identified.    ANTERIOR CEREBRAL ARTERIES: No significant stenosis or occlusion.  MIDDLE CEREBRAL ARTERIES: No significant stenosis or occlusion.  POSTERIOR CEREBRAL ARTERIES: No significant stenosis or occlusion.    DISTAL VERTEBRAL / BASILAR ARTERIES: No significant stenosis or occlusion.    VENOUS STRUCTURES: Visualized superficial and deep venous structures   appear patent.      IMPRESSION:    CT HEAD:  No acute intracranial hemorrhage, mass effect, or midline shift.    CTA NECK:  No evidence of significant stenosis or occlusion.    CTA HEAD:  No large vessel occlusion, significant stenosis or vascular abnormality   identified. No evidence of aneurysm.        --- End of Report ---          ROSARIO VENEGAS MD; Resident Radiologist  This document has been electronically signed.  LAST MAHER MD; Attending Radiologist  This document has been electronically signed. Dec 25 2024 11:54AM (24 @ 11:05)  
38 yo  PP7 from  presents with worsening headache that worsened at 02:30. Patient states her headache woke her from her sleep, is global in nature and describes it as throbbing. She has taken tylenol 100mg and motrin with no relief of her headache. She has been having intermittent headache for the past two days but it became most severe at 02:30. She denies blurry vision, shortness of breath, RUQ pain, abnormal swelling or any other complaints at this time. Patient had no hypertensive disorders in pregnancy prior to elevated BP today in triage.     OBHX:  G1 - 2019 MAB D&C  G2 -   4050g '  G3-      GynHX: denies fibroids, ovarian cysts, STI/herpes. Reports history of abnormal pap smear s/p LEEP in   MedHX: subclinical hypothyroidism  Surghx: denies  Medications: synthroid 75qd  Allergies: NKDA    PHYSICAL EXAM:   Vital Signs Last 24 Hrs  T(C): 36.6 (25 Dec 2024 09:24), Max: 36.6 (25 Dec 2024 09:24)  T(F): 97.8 (25 Dec 2024 09:24), Max: 97.8 (25 Dec 2024 09:24)  HR: 51 (25 Dec 2024 10:15) (47 - 51)  BP: 129/70 (25 Dec 2024 10:15) (124/63 - 147/86)  BP(mean): --  RR: 18 (25 Dec 2024 10:15) (16 - 18)  SpO2: 99% (25 Dec 2024 10:15) (95% - 99%)    Parameters below as of 25 Dec 2024 10:15  Patient On (Oxygen Delivery Method): room air        **************************  Constitutional: Alert & Oriented x3, No acute distress  Respiratory: Clear to ausculation bilaterally; no wheezing, rhonchi, or crackles  Cardiovascular: regular rate and rhythm, no murmurs, or gallops  Gastrointestinal: soft, non tender, fundus firm  Extremities: no calf tenderness or swelling, 2+ reflexes bilaterally      LABS:                        12.9   5.20  )-----------( 251      ( 25 Dec 2024 09:51 )             40.0     12-25    135  |  100  |  14  ----------------------------<  108[H]  3.8   |  25  |  0.61    Ca    8.9      25 Dec 2024 09:51  Mg     1.8         TPro  6.3  /  Alb  3.6  /  TBili  0.4  /  DBili  0.1  /  AST  16  /  ALT  28  /  AlkPhos  147[H]      PT/INR - ( 25 Dec 2024 09:51 )   PT: 12.6 sec;   INR: 1.10          PTT - ( 25 Dec 2024 09:51 )  PTT:32.4 sec  Urinalysis Basic - ( 25 Dec 2024 09:51 )    Color: Yellow / Appearance: Clear / S.019 / pH: x  Gluc: 108 mg/dL / Ketone: Negative mg/dL  / Bili: Negative / Urobili: 0.2 mg/dL   Blood: x / Protein: 30 mg/dL / Nitrite: Negative   Leuk Esterase: Small / RBC: 2 /HPF / WBC 26 /HPF   Sq Epi: x / Non Sq Epi: 8 /HPF / Bacteria: Few /HPF          RADIOLOGY & ADDITIONAL STUDIES:

## 2024-12-25 NOTE — ED ADULT TRIAGE NOTE - CHIEF COMPLAINT QUOTE
pt 1 week post-partum, sent to ED by OB-GYN. pt c/o intermittent headache x 2 days but worsening since this morning. Denies nausea, vomiting or vision changes.

## 2024-12-25 NOTE — CONSULT NOTE ADULT - ASSESSMENT
39F. PMH: 7 days post-partum. Presented 12/25 c/o headache. Patient reports headache started 12/23, but was mild and did not require medication, gradually worsening in intensity and position dependent, raising concern for dural venous sinus thromboses. Nonfocal exam. CTH/CTA (-) for SAH or aneurysm.     Plan:  - MR Brain/MR Venogram w/wo contrast   - Pain regimen per primary team

## 2024-12-25 NOTE — ED PROVIDER NOTE - PROGRESS NOTE DETAILS
Pt reports pain improved some but worsened when laying down for ct.  CT's neg.  Pt discussed w gyn - to receive mag and be admitted for preeclampsia. Mag ordered.

## 2024-12-25 NOTE — H&P ADULT - HISTORY OF PRESENT ILLNESS
40 yo  PP7 from  presents with worsening headache that worsened at 02:30. Patient states her headache woke her from her sleep, is global in nature and describes it as throbbing. She has taken tylenol 100mg and motrin with no relief of her headache. She has been having intermittent headache for the past two days but it became most severe at 02:30. She denies blurry vision, shortness of breath, RUQ pain, abnormal swelling or any other complaints at this time. Patient had no hypertensive disorders in pregnancy prior to elevated BP today in triage.     OBHX:  G1 - 2019 MAB D&C  G2 -   4050g '  G3-      GynHX: denies fibroids, ovarian cysts, STI/herpes. Reports history of abnormal pap smear s/p LEEP in   MedHX: subclinical hypothyroidism  Surghx: denies  Medications: synthroid 75qd  Allergies: NKDA    PHYSICAL EXAM:   Vital Signs Last 24 Hrs  T(C): 36.6 (25 Dec 2024 09:24), Max: 36.6 (25 Dec 2024 09:24)  T(F): 97.8 (25 Dec 2024 09:24), Max: 97.8 (25 Dec 2024 09:24)  HR: 51 (25 Dec 2024 10:15) (47 - 51)  BP: 129/70 (25 Dec 2024 10:15) (124/63 - 147/86)  BP(mean): --  RR: 18 (25 Dec 2024 10:15) (16 - 18)  SpO2: 99% (25 Dec 2024 10:15) (95% - 99%)    Parameters below as of 25 Dec 2024 10:15  Patient On (Oxygen Delivery Method): room air        **************************  Constitutional: Alert & Oriented x3, No acute distress  Respiratory: Clear to ausculation bilaterally; no wheezing, rhonchi, or crackles  Cardiovascular: regular rate and rhythm, no murmurs, or gallops  Gastrointestinal: soft, non tender, fundus firm  Extremities: no calf tenderness or swelling, 2+ reflexes bilaterally      LABS:                        12.9   5.20  )-----------( 251      ( 25 Dec 2024 09:51 )             40.0     12-25    135  |  100  |  14  ----------------------------<  108[H]  3.8   |  25  |  0.61    Ca    8.9      25 Dec 2024 09:51  Mg     1.8         TPro  6.3  /  Alb  3.6  /  TBili  0.4  /  DBili  0.1  /  AST  16  /  ALT  28  /  AlkPhos  147[H]      PT/INR - ( 25 Dec 2024 09:51 )   PT: 12.6 sec;   INR: 1.10          PTT - ( 25 Dec 2024 09:51 )  PTT:32.4 sec  Urinalysis Basic - ( 25 Dec 2024 09:51 )    Color: Yellow / Appearance: Clear / S.019 / pH: x  Gluc: 108 mg/dL / Ketone: Negative mg/dL  / Bili: Negative / Urobili: 0.2 mg/dL   Blood: x / Protein: 30 mg/dL / Nitrite: Negative   Leuk Esterase: Small / RBC: 2 /HPF / WBC 26 /HPF   Sq Epi: x / Non Sq Epi: 8 /HPF / Bacteria: Few /HPF          RADIOLOGY & ADDITIONAL STUDIES:      Assessment and Recommendation:   · Assessment	  40 yo  PP7 from  presents with worsening headache not improved with tylenol and mild range BP in the ED  -patient is hemodynamically and clinically stable  -CT head and angio pending  -labs wnl, no PEC abnormalities  -will re-examine patient after imaging and analgesics    discussed with senior resident Dr. Hairston and attending Dr. Mcdonough    NS PGY2      addendum: CT angio wnl, patient still not feeling better despite Toradol Tylenol and Reglan will treat for PEC w/ SF being headache unimproved with tylenol. Will start Magnesium for 24 hours for stroke/seizure prophylaxis with clinical magnesium checks.    NS PGY2

## 2024-12-25 NOTE — CONSULT NOTE ADULT - ASSESSMENT
40 yo  PP7 from  presents with worsening headache not improved with tylenol and mild range BP in the ED  -patient is hemodynamically and clinically stable  -CT head and angio pending  -labs wnl, no PEC abnormalities  -will re-examine patient after imaging and analgesics  -patient meeting criteria for PEC w/ SF with headache that has not improved with tylenol, will likely admit her to post-partum for Magnesium x 24 hours for stroke/seixure prophylaxis pending how patient feels after ofirmev    discussed with senior resident Dr. Hairston and attending Dr. Sharnoda DONAHUE PGY2 38 yo  PP7 from  presents with worsening headache not improved with tylenol and mild range BP in the ED  -patient is hemodynamically and clinically stable  -CT head and angio pending  -labs wnl, no PEC abnormalities  -will re-examine patient after imaging and analgesics    discussed with senior resident Dr. Hairston and attending Dr. Sharonda DONAHUE PGY2

## 2024-12-25 NOTE — OB RN PATIENT PROFILE - FALL HARM RISK - RISK INTERVENTIONS
Assistance OOB with selected safe patient handling equipment/Communicate Fall Risk and Risk Factors to all staff, patient, and family/Reinforce activity limits and safety measures with patient and family/Review medications for side effects contributing to fall risk/Sit up slowly, dangle for a short time, stand at bedside before walking/Toileting schedule using arm’s reach rule for commode and bathroom/Visual Cue: Yellow wristband/Bed in lowest position, wheels locked, appropriate side rails in place/Call bell, personal items and telephone in reach/Instruct patient to call for assistance before getting out of bed or chair/Non-slip footwear when patient is out of bed/[place] to call system/Physically safe environment - no spills, clutter or unnecessary equipment/Purposeful Proactive Rounding/Room/bathroom lighting operational, light cord in reach

## 2024-12-25 NOTE — ED PROVIDER NOTE - CLINICAL SUMMARY MEDICAL DECISION MAKING FREE TEXT BOX
Pt c/o post partum ha.  BP on arrival 140's, at bedside 124/63.  No sig concern for spinal ha based on hpi.  ? pre-eclampsia, low concern for ich, ? migraine.  Plan labs, ct head, pain meds, gyn consult ; reassess. See progress notes for further mdm related documentation. Pt c/o post partum ha.  BP on arrival 140's, at bedside 124/63.  No sig concern for spinal ha based on hpi - feels better standing/walking.  ? pre-eclampsia, low concern for ich, ? migraine.  Plan labs, ct head, pain meds, gyn consult ; reassess. See progress notes for further mdm related documentation.

## 2024-12-25 NOTE — OB RN PATIENT PROFILE - NSICDXPASTSURGICALHX_GEN_ALL_CORE_FT
PAST SURGICAL HISTORY:  H/O endoscopy     S/P dilation and curettage     [place] teeth removed

## 2024-12-25 NOTE — ED ADULT NURSE NOTE - NSICDXPASTSURGICALHX_GEN_ALL_CORE_FT
PAST SURGICAL HISTORY:  H/O endoscopy     S/P dilation and curettage     [place] teeth removed

## 2024-12-26 ENCOUNTER — TRANSCRIPTION ENCOUNTER (OUTPATIENT)
Age: 39
End: 2024-12-26

## 2024-12-26 LAB
ALBUMIN SERPL ELPH-MCNC: 3.8 G/DL — SIGNIFICANT CHANGE UP (ref 3.3–5)
ALP SERPL-CCNC: 152 U/L — HIGH (ref 40–120)
ALT FLD-CCNC: 28 U/L — SIGNIFICANT CHANGE UP (ref 10–45)
ANION GAP SERPL CALC-SCNC: 11 MMOL/L — SIGNIFICANT CHANGE UP (ref 5–17)
AST SERPL-CCNC: 17 U/L — SIGNIFICANT CHANGE UP (ref 10–40)
BASOPHILS # BLD AUTO: 0.03 K/UL — SIGNIFICANT CHANGE UP (ref 0–0.2)
BASOPHILS NFR BLD AUTO: 0.5 % — SIGNIFICANT CHANGE UP (ref 0–2)
BILIRUB SERPL-MCNC: 0.2 MG/DL — SIGNIFICANT CHANGE UP (ref 0.2–1.2)
BUN SERPL-MCNC: 11 MG/DL — SIGNIFICANT CHANGE UP (ref 7–23)
CALCIUM SERPL-MCNC: 7.8 MG/DL — LOW (ref 8.4–10.5)
CHLORIDE SERPL-SCNC: 101 MMOL/L — SIGNIFICANT CHANGE UP (ref 96–108)
CO2 SERPL-SCNC: 25 MMOL/L — SIGNIFICANT CHANGE UP (ref 22–31)
CREAT SERPL-MCNC: 0.72 MG/DL — SIGNIFICANT CHANGE UP (ref 0.5–1.3)
EGFR: 108 ML/MIN/1.73M2 — SIGNIFICANT CHANGE UP
EOSINOPHIL # BLD AUTO: 0.17 K/UL — SIGNIFICANT CHANGE UP (ref 0–0.5)
EOSINOPHIL NFR BLD AUTO: 2.8 % — SIGNIFICANT CHANGE UP (ref 0–6)
GLUCOSE SERPL-MCNC: 103 MG/DL — HIGH (ref 70–99)
HCT VFR BLD CALC: 41.6 % — SIGNIFICANT CHANGE UP (ref 34.5–45)
HGB BLD-MCNC: 13.8 G/DL — SIGNIFICANT CHANGE UP (ref 11.5–15.5)
IMM GRANULOCYTES NFR BLD AUTO: 0.3 % — SIGNIFICANT CHANGE UP (ref 0–0.9)
LYMPHOCYTES # BLD AUTO: 1.87 K/UL — SIGNIFICANT CHANGE UP (ref 1–3.3)
LYMPHOCYTES # BLD AUTO: 30.8 % — SIGNIFICANT CHANGE UP (ref 13–44)
MAGNESIUM SERPL-MCNC: 5.8 MG/DL — HIGH (ref 1.6–2.6)
MAGNESIUM SERPL-MCNC: 6.3 MG/DL — HIGH (ref 1.6–2.6)
MCHC RBC-ENTMCNC: 29.3 PG — SIGNIFICANT CHANGE UP (ref 27–34)
MCHC RBC-ENTMCNC: 33.2 G/DL — SIGNIFICANT CHANGE UP (ref 32–36)
MCV RBC AUTO: 88.3 FL — SIGNIFICANT CHANGE UP (ref 80–100)
MONOCYTES # BLD AUTO: 0.5 K/UL — SIGNIFICANT CHANGE UP (ref 0–0.9)
MONOCYTES NFR BLD AUTO: 8.2 % — SIGNIFICANT CHANGE UP (ref 2–14)
NEUTROPHILS # BLD AUTO: 3.48 K/UL — SIGNIFICANT CHANGE UP (ref 1.8–7.4)
NEUTROPHILS NFR BLD AUTO: 57.4 % — SIGNIFICANT CHANGE UP (ref 43–77)
NRBC # BLD: 0 /100 WBCS — SIGNIFICANT CHANGE UP (ref 0–0)
PLATELET # BLD AUTO: 254 K/UL — SIGNIFICANT CHANGE UP (ref 150–400)
POTASSIUM SERPL-MCNC: 3.8 MMOL/L — SIGNIFICANT CHANGE UP (ref 3.5–5.3)
POTASSIUM SERPL-SCNC: 3.8 MMOL/L — SIGNIFICANT CHANGE UP (ref 3.5–5.3)
PROT SERPL-MCNC: 6.2 G/DL — SIGNIFICANT CHANGE UP (ref 6–8.3)
RBC # BLD: 4.71 M/UL — SIGNIFICANT CHANGE UP (ref 3.8–5.2)
RBC # FLD: 14 % — SIGNIFICANT CHANGE UP (ref 10.3–14.5)
SODIUM SERPL-SCNC: 137 MMOL/L — SIGNIFICANT CHANGE UP (ref 135–145)
WBC # BLD: 6.07 K/UL — SIGNIFICANT CHANGE UP (ref 3.8–10.5)
WBC # FLD AUTO: 6.07 K/UL — SIGNIFICANT CHANGE UP (ref 3.8–10.5)

## 2024-12-26 PROCEDURE — 99222 1ST HOSP IP/OBS MODERATE 55: CPT

## 2024-12-26 RX ORDER — METOCLOPRAMIDE 10 MG/1
10 TABLET ORAL ONCE
Refills: 0 | Status: COMPLETED | OUTPATIENT
Start: 2024-12-26 | End: 2024-12-26

## 2024-12-26 RX ORDER — DIPHENHYDRAMINE HCL 25 MG
25 TABLET ORAL ONCE
Refills: 0 | Status: COMPLETED | OUTPATIENT
Start: 2024-12-26 | End: 2024-12-26

## 2024-12-26 RX ADMIN — ACETAMINOPHEN 975 MILLIGRAM(S): 80 SOLUTION/ DROPS ORAL at 14:39

## 2024-12-26 RX ADMIN — ACETAMINOPHEN 975 MILLIGRAM(S): 80 SOLUTION/ DROPS ORAL at 08:50

## 2024-12-26 RX ADMIN — Medication 600 MILLIGRAM(S): at 06:34

## 2024-12-26 RX ADMIN — Medication 600 MILLIGRAM(S): at 18:14

## 2024-12-26 RX ADMIN — METOCLOPRAMIDE 10 MILLIGRAM(S): 10 TABLET ORAL at 02:23

## 2024-12-26 RX ADMIN — Medication 600 MILLIGRAM(S): at 00:11

## 2024-12-26 RX ADMIN — Medication 25 MILLIGRAM(S): at 02:24

## 2024-12-26 RX ADMIN — Medication 600 MILLIGRAM(S): at 12:32

## 2024-12-26 RX ADMIN — ACETAMINOPHEN 975 MILLIGRAM(S): 80 SOLUTION/ DROPS ORAL at 20:59

## 2024-12-26 NOTE — PROGRESS NOTE ADULT - ASSESSMENT
A/P 38 yo  PP8 from  presents with PEC with SF (headache non responsive to medications)    #PEC with SF  - Mg to fall off 12:00   - Magnesium clinical checks and serum assay q6 hrs.    - Headache improved to 2/10 with reglan and benadryl  - MRI MRV w/ w/o contrast  (final) with no evidence of dural venous sinus thrombosis. No MRI evidence of acute intracranial pathology. 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   - Normal-mild range BP, not requiring medication    #Postpartum care  - Pain: well controlled   - GI: Tolerating regular diet  - : urinating without difficulty/pain  - DVT prophylaxis: ambulating frequently

## 2024-12-26 NOTE — DISCHARGE NOTE OB - NSPROMEDSBROUGHTTOHOSP_GEN_A_NUR
PGY I NOTE    LENGTH OF HOSPITAL STAY:  9d    Pt seen and examined at bedside. Follows commands, moves all extremities. Pt was clenching fists and appeared anxious/agitated overnight. Gave 2mg Versed push. Responded well. Will add Versed drip    CHIEF COMPLAINT:Patient is a 63y old  Male who presents with a chief complaint of sob, weakness (09 Mar 2018 05:52)    HPI:HPI:  64 y/o M PMH Multiple Myeloma, Anemia who presents with weakness, SOB.  Patient had a dry cough and felt like he was sick for the past 2 weeks and this worsened over the past 2 days.  Patient is unable to walk without being extremely dyspneic , dyspnea gradually progressed to be at rest. Patient had chills in the last 2 days Patient is weak and not eating and not drinking well. Patient also has bone pain in his right flank.  Patient has not seen a doctor in numerous years and is not on medication.  Family felt he looked pale and weak. Patient denies any hematuria, melena or hematochezia. Patient is on iron tablets since 5 years, since then his stool is dark, with no recent change. Patient denies any alteration in bowl movements no nausea or vomiting, but there is decrease in PO intake. In the ER patient received one unit of blood , when he was received the second unit, he spiked a tem of 38.5, no change in hemodynamics, work up for transfusion reaction is sent. Patient was not taking medication or following up with any doctor cause he believes in herbal medicine only. (09 Mar 2018 05:52)    OVERNIGHT EVENTS/INTERVAL UPDATES:    PMH & PSH  PAST MEDICAL & SURGICAL HISTORY:  Anemia, unspecified type  Multiple myeloma, remission status unspecified    SOCIAL HISTORY: Negative    ALLERGIES: No Known Allergies    HOME MEDICATIONS  Home Medications:    PHYSICAL EXAM:  T(F): 98.7 (03-18-18 @ 08:00), Max: 99.4 (03-17-18 @ 16:00)  HR: 76 (03-18-18 @ 08:00)  BP: 96/53 (03-18-18 @ 08:00)  RR: 32 (03-18-18 @ 08:00)  SpO2: 99% (03-18-18 @ 08:00)  CAPILLARY BLOOD GLUCOSE          03-17-18 @ 07:01  -  03-18-18 @ 07:00  --------------------------------------------------------  IN:    dexmedetomidine Infusion: 312.6 mL    Enteral Tube Flush: 280 mL    fentaNYL  Infusion: 545.5 mL    IV PiggyBack: 150 mL    Peptamen A.F.: 1560 mL    PRBCs (Packed Red Blood Cells): 177 mL  Total IN: 3025.1 mL    OUT:    Indwelling Catheter - Urethral: 325 mL    Other: 500 mL  Total OUT: 825 mL    Total NET: 2200.1 mL      03-18-18 @ 07:01  -  03-18-18 @ 09:52  --------------------------------------------------------  IN:    dexmedetomidine Infusion: 13.1 mL    fentaNYL  Infusion: 18.8 mL    Peptamen A.F.: 65 mL  Total IN: 96.9 mL    OUT:    Indwelling Catheter - Urethral: 30 mL  Total OUT: 30 mL    Total NET: 66.9 mL        Mode: AC/ CMV (Assist Control/ Continuous Mandatory Ventilation), RR (machine): 32, TV (machine): 450, FiO2: 40, PEEP: 7.5, ITime: 1, MAP: 16, PIP: 26    General: Agitated/Anxious appearing-clenching fists  HEENT: NCAT  CV: B/L Rhonci  RESP: CTAB  Abdominal: Soft, NTTP  Extremity: no c/c/e  Neuro: A&A    MEDICATIONS  STANDING MEDICATIONS  ALBUTerol    90 MICROgram(s) HFA Inhaler 1 Puff(s) Inhalation every 6 hours  calcium acetate 667 milliGRAM(s) Oral three times a day with meals  calcium carbonate 1250 mG + Vitamin D (OsCal 500 + D) 1 Tablet(s) Oral two times a day  chlorhexidine 0.12% Liquid 15 milliLiter(s) Swish and Spit two times a day  dexmedetomidine Infusion 0.2 MICROgram(s)/kG/Hr IV Continuous <Continuous>  erythromycin     base Tablet 250 milliGRAM(s) Oral every 12 hours  fentaNYL   Infusion 2 MICROgram(s)/kG/Hr IV Continuous <Continuous>  influenza   Vaccine 0.5 milliLiter(s) IntraMuscular once  ipratropium 17 MICROgram(s) HFA Inhaler 1 Puff(s) Inhalation every 6 hours  levoFLOXacin IVPB 250 milliGRAM(s) IV Intermittent every 24 hours  meropenem  IVPB 750 milliGRAM(s) IV Intermittent every 24 hours  metoclopramide 5 milliGRAM(s) Oral two times a day  midazolam Infusion 0.5 mG/Hr IV Continuous <Continuous>  norepinephrine Infusion 0.5 MICROgram(s)/kG/Min IV Continuous <Continuous>  pantoprazole  Injectable 40 milliGRAM(s) IV Push daily  senna 1 Tablet(s) Oral two times a day    PRN MEDICATIONS    LABS:                        6.8    9.50  )-----------( 66       ( 18 Mar 2018 05:52 )             21.1              03-18    139  |  98  |  126<HH>  ----------------------------<  106  5.8<H>   |  24  |  6.9<HH>    Ca    6.9<L>      18 Mar 2018 05:52  Phos  8.1     03-17  Mg     2.2     03-18    TPro  6.1  /  Alb  1.8<L>  /  TBili  0.6  /  DBili  x   /  AST  20  /  ALT  13  /  AlkPhos  67  03-18    LIVER FUNCTIONS - ( 18 Mar 2018 05:52 )  Alb: 1.8 g/dL / Pro: 6.1 g/dL / ALK PHOS: 67 U/L / ALT: 13 U/L / AST: 20 U/L / GGT: x                                        ABG - ( 18 Mar 2018 07:12 )  pH: 7.36  /  pCO2: 41    /  pO2: 69    / HCO3: 23    / Base Excess: -2.2  /  SaO2: 93 no

## 2024-12-26 NOTE — DISCHARGE NOTE OB - FINANCIAL ASSISTANCE
[facility] provides services at a reduced cost to those who are determined to be eligible through [facility]’s financial assistance program. Information regarding [facility]’s financial assistance program can be found by going to [email or web address] or by calling [phone].

## 2024-12-26 NOTE — DISCHARGE NOTE OB - PLAN OF CARE
Take Motrin 600mg every 6 hours and/or tylenol 650mg every 6 hours as needed for pain. You can alternate between motrin and tylenol every 3 hours for better pain control throughout the day. Call your OB to schedule a follow up appointment in 4-6 weeks. Nothing per vagina until cleared by your OB - no intercourse, douching, tampons, etc.  Call your OB if you experience severe abdominal pain not improved by oral pain medications, heavy bright red vaginal bleeding saturating more than 1 pad per hour, or fever greater than 100.4F. Consider contraception options to be discussed with your OB. Please follow up with your OB in 1 week for a blood pressure check. Please purchase an electronic blood pressure cuff at the pharmacy. Check blood pressures at home 3x a day and keep a log to review with your OB/GYN. If your systolic blood pressure (top number) is greater than 140 or your diastolic blood pressure (bottom number) is greater than 90, call your OB/GYN office. If your systolic blood pressure (top number) is greater than 160 or your diastolic blood pressure (bottom number) is greater than 110, please return to the hospital or go to your nearest emergency room right away. If you develop a headache not relieved by tylenol, visual disturbances, or upper abdominal pain, then please return to the hospital or go to your nearest emergency room right away.

## 2024-12-26 NOTE — DISCHARGE NOTE OB - PATIENT PORTAL LINK FT
You can access the FollowMyHealth Patient Portal offered by [facility] by registering at the following website: [email or web address]. By joining [facility]’s FollowMyHealth portal, you will also be able to view your health information using other applications (apps) compatible with our system.

## 2024-12-26 NOTE — DISCHARGE NOTE OB - CARE PROVIDER_API CALL
Brianne Huizar  Obstetrics and Gynecology  [address] [place] [address], [place] [address]  Phone: [phone]  Fax: [phone]  Follow Up Time:

## 2024-12-26 NOTE — PROGRESS NOTE ADULT - ASSESSMENT
39F. PMH: 7 days post-partum. Presented 12/25 c/o headache. Patient reports headache started 12/23, but was mild and did not require medication, gradually worsening in intensity and position dependent, raising concern for dural venous sinus thromboses. Nonfocal exam. CTH/CTA (-) for SAH or aneurysm.     Plan:  - MR Brain/MR Venogram w/wo contrast --> negative for any hemorrhage, infarct, or malformation  - Pain regimen per primary team     *Note incomplete until discussed/seen with attending*   39F. PMH: 7 days post-partum. Presented 12/25 c/o headache. Patient reports headache started 12/23, but was mild and did not require medication, gradually worsening in intensity and position dependent, raising concern for dural venous sinus thromboses. Nonfocal exam. CTH/CTA (-) for SAH or aneurysm. MR Brain/MR Venogram w/wo contrast negative for any acute pathology.    Plan:  - Follow-up with outpatient neurology; no further inpatient neurological workup at this time.  - Pain regimen per primary team   - Neurology will sign-off today. Please call with any further questions.    Case discussed with Dr. Brunson.

## 2024-12-26 NOTE — CHART NOTE - NSCHARTNOTEFT_GEN_A_CORE
Delayed note entry / patient care    Patient evaluated at bedside for clinical magnesium check. She reports persistence of the headache with which she presented to the hospital, now 4/10, frontal. She denies dizziness, visual disturbances, nausea/vomiting, RUQ pain, epigastric pain, and SOB. Pain well controlled.     T(C): 36.3 (24 @ 02:08), Max: 36.9 (24 @ 14:44)  HR: 59 (24 @ 02:08) (51 - 59)  BP: 144/83 (24 @ 02:08) (135/85 - 146/87)  RR: 17 (24 @ 02:08) (17 - 18)  SpO2: 95% (24 @ 02:08) (95% - 99%)  Wt(kg): --    Gen: Well-appearing. NAD.  Resp: Breathing comfortably on RA. Lungs CTAB.  Abd: Soft, no epigastric or RUQ tenderness.  Ext: Bilateral brachioradialis reflexes +2                          13.8   6.07  )-----------( 254      ( 26 Dec 2024 00:00 )             41.6         137  |  101  |  11  ----------------------------<  103[H]  3.8   |  25  |  0.72    Ca    7.8[L]      26 Dec 2024 00:00  Mg     5.8         TPro  6.2  /  Alb  3.8  /  TBili  0.2  /  DBili  x   /  AST  17  /  ALT  28  /  AlkPhos  152[H]  24 @ 07:01  -  24 @ 02:32  --------------------------------------------------------  IN: 1075 mL / OUT: 4800 mL / NET: -3725 mL        A/P: 38 yo  PP8 from  after readmitted with PEC w/SF (headache)    1. Preeclampsia:   - Continue IV Magnesium for 24 hrs. Mg to fall off 12:00   - Magnesium clinical checks and serum assay q6 hrs.    - Currently with 4/10 headache. Plan for reglan and benadryl  - Normal-mild range BP, not requiring medication    Berta Sage MD PGY1

## 2024-12-26 NOTE — DISCHARGE NOTE OB - MEDICATION SUMMARY - MEDICATIONS TO TAKE
I will START or STAY ON the medications listed below when I get home from the hospital:    ibuprofen 600 mg oral tablet  -- 1 tab(s) by mouth every 6 hours  -- Indication: For Pain    acetaminophen 325 mg oral tablet  -- 3 tab(s) by mouth every 6 hours  -- Indication: For Pain    Prenatal Multivitamins oral tablet  -- orally once a day  -- Indication: For Postpartum state    Admit/Transfer to Inpatient Psychiatry

## 2024-12-26 NOTE — DISCHARGE NOTE OB - HOSPITAL COURSE
Patient readmitted on  PP6 after  for worsening headaches nonresolving with medications. She is s/p IV Mg course on . She was receiving toradol, reglan, and ofirmev for headache. She received a CT head and CTA which was WNL. She also had a MRI MRV s/s/ Patient readmitted on  PP6 after  for worsening headaches nonresolving with medications. She is s/p IV Mg course on . She was receiving toradol, reglan, and ofirmev for headache. She received a CT head and CTA which was WNL. She also had a MRI MRV with o evidence of dural venous sinus thrombosis. No MRI evidence of acute intracranial pathology. 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  Patient readmitted on  PP6 after  for worsening headaches nonresolving with medications. She is s/p IV Mg course on . She was receiving toradol, reglan, and ofirmev for headache. She received a CT head and CTA which was WNL. She also had a MRI MRV with no evidence of dural venous sinus thrombosis. No MRI evidence of acute intracranial pathology. 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 She endorsed resolution of headache at time of discharge. She will follow up with neurology outpatient and with Dr. Mcdonough within two weeks. At time of discharge, patient was clinically and hemodynamically stable for discharge.

## 2024-12-26 NOTE — DISCHARGE NOTE OB - CARE PLAN
Principal Discharge DX:	Severe preeclampsia  Assessment and plan of treatment:	Please follow up with your OB in 1 week for a blood pressure check. Please purchase an electronic blood pressure cuff at the pharmacy. Check blood pressures at home 3x a day and keep a log to review with your OB/GYN. If your systolic blood pressure (top number) is greater than 140 or your diastolic blood pressure (bottom number) is greater than 90, call your OB/GYN office. If your systolic blood pressure (top number) is greater than 160 or your diastolic blood pressure (bottom number) is greater than 110, please return to the hospital or go to your nearest emergency room right away. If you develop a headache not relieved by tylenol, visual disturbances, or upper abdominal pain, then please return to the hospital or go to your nearest emergency room right away.  Secondary Diagnosis:	Postpartum state  Assessment and plan of treatment:	Take Motrin 600mg every 6 hours and/or tylenol 650mg every 6 hours as needed for pain. You can alternate between motrin and tylenol every 3 hours for better pain control throughout the day. Call your OB to schedule a follow up appointment in 4-6 weeks. Nothing per vagina until cleared by your OB - no intercourse, douching, tampons, etc.  Call your OB if you experience severe abdominal pain not improved by oral pain medications, heavy bright red vaginal bleeding saturating more than 1 pad per hour, or fever greater than 100.4F. Consider contraception options to be discussed with your OB.   1

## 2024-12-27 VITALS
HEART RATE: 62 BPM | SYSTOLIC BLOOD PRESSURE: 145 MMHG | RESPIRATION RATE: 18 BRPM | OXYGEN SATURATION: 96 % | TEMPERATURE: 98 F | DIASTOLIC BLOOD PRESSURE: 75 MMHG

## 2024-12-27 PROCEDURE — 80048 BASIC METABOLIC PNL TOTAL CA: CPT

## 2024-12-27 PROCEDURE — 83615 LACTATE (LD) (LDH) ENZYME: CPT

## 2024-12-27 PROCEDURE — 80053 COMPREHEN METABOLIC PANEL: CPT

## 2024-12-27 PROCEDURE — 93005 ELECTROCARDIOGRAM TRACING: CPT

## 2024-12-27 PROCEDURE — 85730 THROMBOPLASTIN TIME PARTIAL: CPT

## 2024-12-27 PROCEDURE — 70546 MR ANGIOGRAPH HEAD W/O&W/DYE: CPT | Mod: MC

## 2024-12-27 PROCEDURE — 85610 PROTHROMBIN TIME: CPT

## 2024-12-27 PROCEDURE — 80076 HEPATIC FUNCTION PANEL: CPT

## 2024-12-27 PROCEDURE — 87077 CULTURE AEROBIC IDENTIFY: CPT

## 2024-12-27 PROCEDURE — 84550 ASSAY OF BLOOD/URIC ACID: CPT

## 2024-12-27 PROCEDURE — 87086 URINE CULTURE/COLONY COUNT: CPT

## 2024-12-27 PROCEDURE — 70496 CT ANGIOGRAPHY HEAD: CPT | Mod: MC

## 2024-12-27 PROCEDURE — 70498 CT ANGIOGRAPHY NECK: CPT | Mod: MC

## 2024-12-27 PROCEDURE — 70450 CT HEAD/BRAIN W/O DYE: CPT | Mod: MC

## 2024-12-27 PROCEDURE — 83735 ASSAY OF MAGNESIUM: CPT

## 2024-12-27 PROCEDURE — 81001 URINALYSIS AUTO W/SCOPE: CPT

## 2024-12-27 PROCEDURE — 96375 TX/PRO/DX INJ NEW DRUG ADDON: CPT

## 2024-12-27 PROCEDURE — 96374 THER/PROPH/DIAG INJ IV PUSH: CPT

## 2024-12-27 PROCEDURE — A9585: CPT

## 2024-12-27 PROCEDURE — 99285 EMERGENCY DEPT VISIT HI MDM: CPT

## 2024-12-27 PROCEDURE — 70553 MRI BRAIN STEM W/O & W/DYE: CPT | Mod: MC

## 2024-12-27 PROCEDURE — 85027 COMPLETE CBC AUTOMATED: CPT

## 2024-12-27 PROCEDURE — 85025 COMPLETE CBC W/AUTO DIFF WBC: CPT

## 2024-12-27 PROCEDURE — 36415 COLL VENOUS BLD VENIPUNCTURE: CPT

## 2024-12-27 RX ORDER — METOCLOPRAMIDE 10 MG/1
10 TABLET ORAL ONCE
Refills: 0 | Status: COMPLETED | OUTPATIENT
Start: 2024-12-27 | End: 2024-12-27

## 2024-12-27 RX ADMIN — METOCLOPRAMIDE 10 MILLIGRAM(S): 10 TABLET ORAL at 06:32

## 2024-12-27 RX ADMIN — ACETAMINOPHEN 975 MILLIGRAM(S): 80 SOLUTION/ DROPS ORAL at 09:25

## 2024-12-27 RX ADMIN — Medication 600 MILLIGRAM(S): at 04:17

## 2024-12-27 NOTE — PROGRESS NOTE ADULT - ASSESSMENT
A/P 38 yo  PP9 from  readmitted for PEC with SF (headache non responsive to medications)    #PEC with SF  - s/p IV Mg  at 12:00  - MRI MRV w/ w/o contrast  with no evidence of dural venous sinus thrombosis. No MRI evidence of acute intracranial pathology. Plan for outpatient followup.  - Normal-mild range BP, not requiring medication    #Postpartum care  - Pain: well controlled   - GI: Tolerating regular diet  - : urinating without difficulty/pain  - DVT prophylaxis: ambulating frequently     A/P 38 yo  PP9 from  readmitted for PEC with SF (headache non responsive to medications)    #PEC with SF  - s/p IV Mg  at 12:00  - Patient s/p reglan and benadryl with no current symptoms of headache.  - MRI MRV w/ w/o contrast  with no evidence of dural venous sinus thrombosis. No MRI evidence of acute intracranial pathology. Plan for outpatient followup.  - Normal-mild range BP, not requiring medication    #Postpartum care  - Pain: well controlled   - GI: Tolerating regular diet  - : urinating without difficulty/pain  - DVT prophylaxis: ambulating frequently

## 2024-12-27 NOTE — PROGRESS NOTE ADULT - ATTENDING COMMENTS
Pt seen and examined at bedside. Pt headache is a 1-2 on pain scale. Feeling much better. BP controlled, Neuro signed off plan for follow up as outpatient. Plan for discharge in am.
Agree with above, patient seen by me prior to discharge  P2 now PPD9 s/p , readmitted on PPD7 w/ severe headache meeting criteria for PEC w/ SF now s/p 24h Mag  s/p negative head imaging, followed by neurology inpatient. pt to follow up with neuro outpatient  BP normal, not on antihypertensives.  Stable for discharge home, has follow up in office in 1 week
I was physically present for the key portions of the evaluation and managemnent (E/M) service provided.  I agree with the above history, physical, and plan which I have reviewed and edited where appropriate, with the exceptions as per my note.    pt seen and examined.    normal neuro exam. no papilledema.    pt reports HA is nearly resolved.    MRI brain and MRV unrevealing outside of small capillary telangiectasia. CTA was unrevealing.    AP: unclear if this was related to PEC given mild HTN, or whether it was atypical migraine given pt's chronic HA history, although migraine would be atypical given degree of severity and duration and pt's reportedly only mild chronic HA hx. regardless, improvement and negative imaging findings is reassuring. call w questions. f/u w gen neuro as outpt.

## 2024-12-27 NOTE — PROGRESS NOTE ADULT - SUBJECTIVE AND OBJECTIVE BOX
Patient evaluated at bedside this morning, resting comfortable in bed, no acute events overnight. Patient with normal-mild range BP overnight, denies toxic symptoms of PEC this morning.   She denies headache, dizziness, chest pain, palpitations, shortness of breath, nausea, vomiting, fever, chills, heavy vaginal bleeding. She has been ambulating without assistance, voiding spontaneously.  Tolerating food well, without nausea/vomit.      Physical Exam:  T(C): 36.7 (12-27-24 @ 06:00), Max: 36.7 (12-27-24 @ 02:00)  HR: 62 (12-27-24 @ 06:00) (60 - 62)  BP: 145/75 (12-27-24 @ 06:00) (129/79 - 145/75)  RR: 18 (12-27-24 @ 06:00) (16 - 18)  SpO2: 96% (12-27-24 @ 06:00) (96% - 96%)    GA: NAD, A&O x 3  Pulm: no increased work of breathing  Abd: soft, nontender, nondistended, no rebound or guarding, uterus firm.  Extremities: no calf tenderness                          13.8   6.07  )-----------( 254      ( 26 Dec 2024 00:00 )             41.6     12-26    137  |  101  |  11  ----------------------------<  103[H]  3.8   |  25  |  0.72    Ca    7.8[L]      26 Dec 2024 00:00  Mg     6.3     12-26    TPro  6.2  /  Alb  3.8  /  TBili  0.2  /  DBili  x   /  AST  17  /  ALT  28  /  AlkPhos  152[H]  12-26    acetaminophen     Tablet .. 975 milliGRAM(s) Oral <User Schedule>  ibuprofen  Tablet. 600 milliGRAM(s) Oral every 6 hours  lactated ringers. 1000 milliLiter(s) IV Continuous <Continuous>  magnesium sulfate Infusion 1 Gm/Hr IV Continuous <Continuous>  oxyCODONE    IR 5 milliGRAM(s) Oral every 3 hours PRN  oxyCODONE    IR 5 milliGRAM(s) Oral once PRN  
Neurology Progress Note    Interval History:    The patient was encountered sitting up in bed, AAOx3, in no acute distress, and endorsing improvement in her headache. She notes that sometime around early morning vitals check (~4am), the headache began to resolve, and at this encounter it was 2/10 in severity, increasing to 3/10 throughout the course of the interview. She denies any history of migraines, and notes she has never had a headache like this before. She also reported increased bleeding this morning (minimally, but noticeable to her), but otherwise denied any new or acute complaints. Specifically, she denied vision changes, dizziness, neck stiffness, photophobia and phonophobia.       OBHX:  G1 -  MAB D&C  G2 -   4050g '  G3-      GynHX: denies fibroids, ovarian cysts, STI/herpes. Reports history of abnormal pap smear s/p LEEP in   MedHX: subclinical hypothyroidism  Surghx: denies  Medications: synthroid 75qd  Allergies: NKDA    PHYSICAL EXAM:   Vital Signs Last 24 Hrs  T(C): 36.6 (25 Dec 2024 09:24), Max: 36.6 (25 Dec 2024 09:24)  T(F): 97.8 (25 Dec 2024 09:24), Max: 97.8 (25 Dec 2024 09:24)  HR: 51 (25 Dec 2024 10:15) (47 - 51)  BP: 129/70 (25 Dec 2024 10:15) (124/63 - 147/86)  BP(mean): --  RR: 18 (25 Dec 2024 10:15) (16 - 18)  SpO2: 99% (25 Dec 2024 10:15) (95% - 99%)    Parameters below as of 25 Dec 2024 10:15  Patient On (Oxygen Delivery Method): room air        **************************  Constitutional: Alert & Oriented x3, No acute distress  Respiratory: Clear to ausculation bilaterally; no wheezing, rhonchi, or crackles  Cardiovascular: regular rate and rhythm, no murmurs, or gallops  Gastrointestinal: soft, non tender, fundus firm  Extremities: no calf tenderness or swelling, 2+ reflexes bilaterally      LABS:                        12.9   5.20  )-----------( 251      ( 25 Dec 2024 09:51 )             40.0     12-    135  |  100  |  14  ----------------------------<  108[H]  3.8   |  25  |  0.61    Ca    8.9      25 Dec 2024 09:51  Mg     1.8     12-25    TPro  6.3  /  Alb  3.6  /  TBili  0.4  /  DBili  0.1  /  AST  16  /  ALT  28  /  AlkPhos  147[H]  12-25    PT/INR - ( 25 Dec 2024 09:51 )   PT: 12.6 sec;   INR: 1.10          PTT - ( 25 Dec 2024 09:51 )  PTT:32.4 sec  Urinalysis Basic - ( 25 Dec 2024 09:51 )    Color: Yellow / Appearance: Clear / S.019 / pH: x  Gluc: 108 mg/dL / Ketone: Negative mg/dL  / Bili: Negative / Urobili: 0.2 mg/dL   Blood: x / Protein: 30 mg/dL / Nitrite: Negative   Leuk Esterase: Small / RBC: 2 /HPF / WBC 26 /HPF   Sq Epi: x / Non Sq Epi: 8 /HPF / Bacteria: Few /HPF          RADIOLOGY & ADDITIONAL STUDIES:      Assessment and Recommendation:   · Assessment	  40 yo  PP7 from  presents with worsening headache not improved with tylenol and mild range BP in the ED  -patient is hemodynamically and clinically stable  -CT head and angio pending  -labs wnl, no PEC abnormalities  -will re-examine patient after imaging and analgesics    discussed with senior resident Dr. Hairston and attending Dr. Mcdonough    NS PGY2      addendum: CT angio wnl, patient still not feeling better despite Toradol Tylenol and Reglan will treat for PEC w/ SF being headache unimproved with tylenol. Will start Magnesium for 24 hours for stroke/seizure prophylaxis with clinical magnesium checks.    NS PGY2 (25 Dec 2024 12:10)      PAST MEDICAL & SURGICAL HISTORY:  Hypothyroid    H/O cardiac arrhythmia  had sonogram and everything was ok     (normal spontaneous vaginal delivery)    H/O endoscopy    [place] teeth removed    S/P dilation and curettage            Medications:  acetaminophen     Tablet .. 975 milliGRAM(s) Oral <User Schedule>  ibuprofen  Tablet. 600 milliGRAM(s) Oral every 6 hours  lactated ringers. 1000 milliLiter(s) IV Continuous <Continuous>  magnesium sulfate Infusion 2 Gm/Hr IV Continuous <Continuous>  oxyCODONE    IR 5 milliGRAM(s) Oral every 3 hours PRN  oxyCODONE    IR 5 milliGRAM(s) Oral once PRN      Vital Signs Last 24 Hrs  T(C): 36.7 (26 Dec 2024 06:27), Max: 36.9 (25 Dec 2024 14:10)  T(F): 98 (26 Dec 2024 06:27), Max: 98.4 (25 Dec 2024 14:10)  HR: 58 (26 Dec 2024 08:00) (47 - 59)  BP: 145/92 (26 Dec 2024 08:00) (124/63 - 147/86)  BP(mean): --  RR: 18 (26 Dec 2024 08:00) (16 - 18)  SpO2: 97% (26 Dec 2024 08:00) (95% - 99%)    Parameters below as of 26 Dec 2024 08:00  Patient On (Oxygen Delivery Method): room air      Neurological Exam:   Mental status: Awake, alert and oriented to person, place, and time. Naming, repetition and comprehension intact.  Attention/concentration intact.  No dysarthria, no aphasia.  Cranial nerves:   - Eyes: PERRL, EOMI without nystagmus, Visual fields full   - Face: BL V1-V3 sensation intact symmetrically, face symmetric   - ENT: Hearing intact to voice, tongue was midline  Motor: No drift in all 4 extremities, 5/5 RUTH ANN&LE. Normal tone and bulk.  No abnormal movements.    Sensation: Intact to light touch , no extinction   Coordination: No dysmetria on finger-to-nose and heel-to-shin.  No dysdiadochokinesia.  Reflexes: 2+ in bilateral UE/LE   Gait: Deferred    Labs:  CBC Full  -  ( 26 Dec 2024 00:00 )  WBC Count : 6.07 K/uL  RBC Count : 4.71 M/uL  Hemoglobin : 13.8 g/dL  Hematocrit : 41.6 %  Platelet Count - Automated : 254 K/uL  Mean Cell Volume : 88.3 fl  Mean Cell Hemoglobin : 29.3 pg  Mean Cell Hemoglobin Concentration : 33.2 g/dL  Auto Neutrophil # : 3.48 K/uL  Auto Lymphocyte # : 1.87 K/uL  Auto Monocyte # : 0.50 K/uL  Auto Eosinophil # : 0.17 K/uL  Auto Basophil # : 0.03 K/uL  Auto Neutrophil % : 57.4 %  Auto Lymphocyte % : 30.8 %  Auto Monocyte % : 8.2 %  Auto Eosinophil % : 2.8 %  Auto Basophil % : 0.5 %        137  |  101  |  11  ----------------------------<  103[H]  3.8   |  25  |  0.72    Ca    7.8[L]      26 Dec 2024 00:00  Mg     6.3         TPro  6.2  /  Alb  3.8  /  TBili  0.2  /  DBili  x   /  AST  17  /  ALT  28  /  AlkPhos  152[H]      LIVER FUNCTIONS - ( 26 Dec 2024 00:00 )  Alb: 3.8 g/dL / Pro: 6.2 g/dL / ALK PHOS: 152 U/L / ALT: 28 U/L / AST: 17 U/L / GGT: x           PT/INR - ( 25 Dec 2024 09:51 )   PT: 12.6 sec;   INR: 1.10          PTT - ( 25 Dec 2024 09:51 )  PTT:32.4 sec  Urinalysis Basic - ( 26 Dec 2024 00:00 )    Color: x / Appearance: x / SG: x / pH: x  Gluc: 103 mg/dL / Ketone: x  / Bili: x / Urobili: x   Blood: x / Protein: x / Nitrite: x   Leuk Esterase: x / RBC: x / WBC x   Sq Epi: x / Non Sq Epi: x / Bacteria: x        Assessment:  This is a 39y Female w/ h/o     Plan:
Patient evaluated at bedside for a magnesium check and morning evaluation, resting comfortable in bed, no acute events overnight. Patient on IV Mg. Normal-mild range BPs overnight. Denies toxic symptoms of PEC. Patient describes improvement of headache from 4/10 overnight to 2/10 after reglan and benadryl.     She denies dizziness, chest pain, palpitations, shortness of breath, nausea, vomiting, fever, chills, heavy vaginal bleeding. She has been ambulating without assistance, voiding spontaneously.  Tolerating food well, without nausea/vomit.      Physical Exam:  T(C): 36.7 (12-26-24 @ 06:27), Max: 36.7 (12-25-24 @ 20:00)  HR: 55 (12-26-24 @ 06:27) (52 - 59)  BP: 137/86 (12-26-24 @ 06:27) (137/86 - 146/87)  RR: 17 (12-26-24 @ 06:27) (17 - 18)  SpO2: 96% (12-26-24 @ 06:27) (95% - 98%)    GA: NAD, A&O x 3  Pulm: no increased work of breathing  Abd: soft, nontender, nondistended, no rebound or guarding, uterus firm.  Extremities: no calf tenderness                          13.8   6.07  )-----------( 254      ( 26 Dec 2024 00:00 )             41.6     12-26    137  |  101  |  11  ----------------------------<  103[H]  3.8   |  25  |  0.72    Ca    7.8[L]      26 Dec 2024 00:00  Mg     6.3     12-26    TPro  6.2  /  Alb  3.8  /  TBili  0.2  /  DBili  x   /  AST  17  /  ALT  28  /  AlkPhos  152[H]  12-26    acetaminophen     Tablet .. 975 milliGRAM(s) Oral <User Schedule>  ibuprofen  Tablet. 600 milliGRAM(s) Oral every 6 hours  lactated ringers. 1000 milliLiter(s) IV Continuous <Continuous>  magnesium sulfate Infusion 2 Gm/Hr IV Continuous <Continuous>  oxyCODONE    IR 5 milliGRAM(s) Oral every 3 hours PRN  oxyCODONE    IR 5 milliGRAM(s) Oral once PRN

## 2024-12-30 ENCOUNTER — NON-APPOINTMENT (OUTPATIENT)
Age: 39
End: 2024-12-30

## 2024-12-30 DIAGNOSIS — Z3A.41 41 WEEKS GESTATION OF PREGNANCY: ICD-10-CM

## 2024-12-30 DIAGNOSIS — O48.0 POST-TERM PREGNANCY: ICD-10-CM

## 2024-12-30 DIAGNOSIS — E03.9 HYPOTHYROIDISM, UNSPECIFIED: ICD-10-CM

## 2024-12-30 DIAGNOSIS — Z79.890 HORMONE REPLACEMENT THERAPY: ICD-10-CM

## 2024-12-31 DIAGNOSIS — I78.1 NEVUS, NON-NEOPLASTIC: ICD-10-CM

## 2024-12-31 DIAGNOSIS — Z41.8 ENCOUNTER FOR OTHER PROCEDURES FOR PURPOSES OTHER THAN REMEDYING HEALTH STATE: ICD-10-CM

## 2024-12-31 DIAGNOSIS — E03.9 HYPOTHYROIDISM, UNSPECIFIED: ICD-10-CM

## 2025-01-02 ENCOUNTER — APPOINTMENT (OUTPATIENT)
Dept: NEUROLOGY | Facility: CLINIC | Age: 40
End: 2025-01-02
Payer: COMMERCIAL

## 2025-01-02 VITALS
BODY MASS INDEX: 25.33 KG/M2 | SYSTOLIC BLOOD PRESSURE: 128 MMHG | WEIGHT: 152 LBS | DIASTOLIC BLOOD PRESSURE: 88 MMHG | HEART RATE: 71 BPM | HEIGHT: 65 IN

## 2025-01-02 DIAGNOSIS — G43.011 MIGRAINE W/OUT AURA, INTRACTABLE, WITH STATUS MIGRAINOSUS: ICD-10-CM

## 2025-01-02 DIAGNOSIS — G43.109 MIGRAINE WITH AURA, NOT INTRACTABLE, W/OUT STATUS MIGRAINOSUS: ICD-10-CM

## 2025-01-02 DIAGNOSIS — H54.7 UNSPECIFIED VISUAL LOSS: ICD-10-CM

## 2025-01-02 PROCEDURE — 99205 OFFICE O/P NEW HI 60 MIN: CPT

## 2025-01-02 RX ORDER — NABUMETONE 500 MG/1
500 TABLET, FILM COATED ORAL
Qty: 14 | Refills: 0 | Status: ACTIVE | COMMUNITY
Start: 2025-01-02 | End: 1900-01-01

## 2025-01-02 RX ORDER — ACETAMINOPHEN 500 MG
500 TABLET ORAL
Refills: 0 | Status: ACTIVE | COMMUNITY

## 2025-01-08 ENCOUNTER — OUTPATIENT (OUTPATIENT)
Dept: OUTPATIENT SERVICES | Facility: HOSPITAL | Age: 40
LOS: 1 days | End: 2025-01-08

## 2025-01-08 ENCOUNTER — NON-APPOINTMENT (OUTPATIENT)
Age: 40
End: 2025-01-08

## 2025-01-08 ENCOUNTER — APPOINTMENT (OUTPATIENT)
Dept: MRI IMAGING | Facility: CLINIC | Age: 40
End: 2025-01-08
Payer: COMMERCIAL

## 2025-01-08 DIAGNOSIS — I99.9 UNSPECIFIED DISORDER OF CIRCULATORY SYSTEM: ICD-10-CM

## 2025-01-08 DIAGNOSIS — Z98.890 OTHER SPECIFIED POSTPROCEDURAL STATES: Chronic | ICD-10-CM

## 2025-01-08 DIAGNOSIS — K08.409 PARTIAL LOSS OF TEETH, UNSPECIFIED CAUSE, UNSPECIFIED CLASS: Chronic | ICD-10-CM

## 2025-01-08 PROCEDURE — 70551 MRI BRAIN STEM W/O DYE: CPT | Mod: 26

## 2025-01-16 ENCOUNTER — APPOINTMENT (OUTPATIENT)
Dept: NEUROLOGY | Facility: CLINIC | Age: 40
End: 2025-01-16
Payer: COMMERCIAL

## 2025-01-16 DIAGNOSIS — G43.011 MIGRAINE W/OUT AURA, INTRACTABLE, WITH STATUS MIGRAINOSUS: ICD-10-CM

## 2025-01-16 DIAGNOSIS — G44.219 EPISODIC TENSION-TYPE HEADACHE, NOT INTRACTABLE: ICD-10-CM

## 2025-01-16 DIAGNOSIS — G43.109 MIGRAINE WITH AURA, NOT INTRACTABLE, W/OUT STATUS MIGRAINOSUS: ICD-10-CM

## 2025-01-16 PROCEDURE — 99213 OFFICE O/P EST LOW 20 MIN: CPT | Mod: 95

## 2025-02-10 ENCOUNTER — APPOINTMENT (OUTPATIENT)
Dept: NEUROLOGY | Facility: CLINIC | Age: 40
End: 2025-02-10

## 2025-02-13 ENCOUNTER — NON-APPOINTMENT (OUTPATIENT)
Age: 40
End: 2025-02-13

## 2025-05-07 NOTE — OB RN PATIENT PROFILE - IF RESULT GREATER THAN 35 DAYS OLD SELECT STATUS
"I haven't been able to take my medications for over a month" pt uses oxygen concentrator; c/o shortness of breath; c/o bilateral leg pain N/A

## (undated) DEVICE — VACUUM CURETTE MEDGYN CURVED 13MM

## (undated) DEVICE — VACUUM CURETTE BERKLEY OLYMPUS CURVED 7MM

## (undated) DEVICE — PACK D&C

## (undated) DEVICE — VACUUM CURETTE BERKLEY OLYMPUS CURVED 11MM

## (undated) DEVICE — WARMING BLANKET UPPER ADULT

## (undated) DEVICE — VACUUM CURETTE BUSSE HOSP CURVED 12MM

## (undated) DEVICE — VACUUM CURETTE BERKLEY OLYMPUS CURVED 12MM

## (undated) DEVICE — CANISTER SUCTION VAC

## (undated) DEVICE — DRSG TELFA 3 X 8

## (undated) DEVICE — TUBING SUCTION CONNECTOR UTERINE ASPIRATION UVAC 0.5" X 6FT

## (undated) DEVICE — VACUUM CURETTE BERKLEY OLYMPUS CURVED 9MM

## (undated) DEVICE — VACUUM CURETTE BERKLEY OLYMPUS CURVED 8MM

## (undated) DEVICE — VENODYNE/SCD SLEEVE CALF MEDIUM

## (undated) DEVICE — GLV 6.5 PROTEXIS (WHITE)

## (undated) DEVICE — TISSUE TRAP BERKELEY SAFE TOUCH